# Patient Record
Sex: MALE | Race: WHITE | NOT HISPANIC OR LATINO | Employment: FULL TIME | URBAN - METROPOLITAN AREA
[De-identification: names, ages, dates, MRNs, and addresses within clinical notes are randomized per-mention and may not be internally consistent; named-entity substitution may affect disease eponyms.]

---

## 2017-03-29 ENCOUNTER — TELEPHONE (OUTPATIENT)
Dept: CARDIOLOGY | Facility: CLINIC | Age: 57
End: 2017-03-29

## 2017-03-29 NOTE — TELEPHONE ENCOUNTER
03/29/17  11:26 AM  Jose Negrete  1960    Home Phone [phone]   Mobile [phone]     Mrs. Negrete calls to see when Mr. Negrete is due for a yearly appointment. He was last seen June 2016. Michelle, can you call Mr. Cornell at [phone] or his wife, Laney at [phone] to schedule this.    Also, Laney states that they have applied for new life insurance and the insurance company has sent paperwork to our office to be completed by Dr. Vasquez. Teresa, has this been received? If not, Laney can be reached at [phone].    Patricia PAULSON RN

## 2017-03-30 NOTE — TELEPHONE ENCOUNTER
I SWP and let him know that I have not received his paperwork from life insurance company. He is having them refax...Teresa

## 2017-03-30 NOTE — TELEPHONE ENCOUNTER
tomás REYNOSO has been made.    Teresa sutherland is wondering if you've received any papers from his life insurance company?    Michelle GARCIA

## 2017-06-12 RX ORDER — FLECAINIDE ACETATE 100 MG/1
TABLET ORAL
Qty: 60 TABLET | Refills: 2 | OUTPATIENT
Start: 2017-06-12

## 2017-06-12 RX ORDER — FLECAINIDE ACETATE 100 MG/1
100 TABLET ORAL 2 TIMES DAILY
Qty: 60 TABLET | Refills: 3 | Status: SHIPPED | OUTPATIENT
Start: 2017-06-12 | End: 2017-11-16 | Stop reason: SDUPTHER

## 2017-06-26 ENCOUNTER — OFFICE VISIT (OUTPATIENT)
Dept: CARDIOLOGY | Facility: CLINIC | Age: 57
End: 2017-06-26

## 2017-06-26 VITALS
SYSTOLIC BLOOD PRESSURE: 126 MMHG | HEART RATE: 55 BPM | DIASTOLIC BLOOD PRESSURE: 82 MMHG | BODY MASS INDEX: 23.84 KG/M2 | HEIGHT: 72 IN | WEIGHT: 176 LBS

## 2017-06-26 DIAGNOSIS — R00.2 PALPITATIONS: Primary | ICD-10-CM

## 2017-06-26 DIAGNOSIS — Z86.79 HISTORY OF ATRIAL FIBRILLATION: ICD-10-CM

## 2017-06-26 PROCEDURE — 93000 ELECTROCARDIOGRAM COMPLETE: CPT | Performed by: NURSE PRACTITIONER

## 2017-06-26 PROCEDURE — 99213 OFFICE O/P EST LOW 20 MIN: CPT | Performed by: NURSE PRACTITIONER

## 2017-06-26 NOTE — PROGRESS NOTES
Date of Office Visit: 2017  Encounter Provider: JAMES Morocho  Place of Service: [facility] CARDIOLOGY  Patient Name: Jose Negrete  :1960    Chief Complaint   Patient presents with   • Atrial Fibrillation   • Palpitations   :     HPI: Jose Negrete is a 57 y.o. male who is a patient of Dr. Vasquez's, new to me today. Old records reviewed. He has a past medical history of paroxysmal atrial fib status post pulmonary vein isolation in  in [place].  He has had some intermittent palpitations over the years and some paroxysmal atrial tach for which he has been on flecainide.  When he saw Dr. Vasquez a year ago they discussed stopping the flecainide however he is hesitant to do that and currently takes it about 4 times a week (which is usually when he remembers).  He says he is doing well. He has not had any palpitations.  He denies chest pain, shortness of breath, PND, dizziness or near syncope.  He presents today for routine follow up.       Past Medical History:   Diagnosis Date   • Atrial tachycardia        Past Surgical History:   Procedure Laterality Date   • ATRIAL ABLATION SURGERY         Social History     Social History   • Marital status:      Spouse name: N/A   • Number of children: N/A   • Years of education: N/A     Occupational History   • Not on file.     Social History Main Topics   • Smoking status: Never Smoker   • Smokeless tobacco: Not on file   • Alcohol use Yes   • Drug use: Not on file   • Sexual activity: Not on file     Other Topics Concern   • Not on file     Social History Narrative       Family History   Problem Relation Age of Onset   • Parkinsonism Father        Review of Systems   Constitution: Negative for chills, fever, malaise/fatigue, weight gain and weight loss.   HENT: Negative for ear pain, headaches, hearing loss, nosebleeds and sore throat.    Eyes: Negative for double vision, pain and visual disturbance.  "  Cardiovascular: Negative for chest pain, dyspnea on exertion, irregular heartbeat, leg swelling, near-syncope, orthopnea, palpitations, paroxysmal nocturnal dyspnea and syncope.   Respiratory: Negative for cough, shortness of breath, sleep disturbances due to breathing, snoring and wheezing.    Endocrine: Negative for cold intolerance, heat intolerance and polyuria.   Skin: Negative for itching and rash.   Musculoskeletal: Negative for joint pain, joint swelling and myalgias.   Gastrointestinal: Negative for abdominal pain, diarrhea, melena, nausea and vomiting.   Genitourinary: Negative for frequency, hematuria and hesitancy.   Neurological: Negative for excessive daytime sleepiness, light-headedness, numbness, paresthesias and seizures.   Psychiatric/Behavioral: Negative for altered mental status and depression.   Allergic/Immunologic: Negative.    All other systems reviewed and are negative.      No Known Allergies      Current Outpatient Prescriptions:   •  flecainide (TAMBOCOR) 100 MG tablet, Take 1 tablet by mouth 2 (Two) Times a Day., Disp: 60 tablet, Rfl: 3     Objective:     Vitals:    06/26/17 0900   BP: 126/82   BP Location: Right arm   Patient Position: Sitting   Pulse: 55   Weight: 176 lb (79.8 kg)   Height: 72\" (182.9 cm)     Body mass index is 23.87 kg/(m^2).    PHYSICAL EXAM:    Vitals Reviewed.   General Appearance: No acute distress, well developed and well nourished.   Eyes: Conjunctiva and lids: No erythema, swelling, or discharge. Sclera non-icteric.   HENT: Atraumatic, normocephalic. External eyes, ears, and nose normal. No hearing loss noted. Mucous membranes normal. Lips not cyanotic. Neck supple with no tenderness.  Respiratory: No signs of respiratory distress. Respiration rhythm and depth normal.   Clear to auscultation. No rales, crackles, rhonchi, or wheezing auscultated.   Cardiovascular:  Jugular Venous Pressure: Normal  Heart Rate and Rhythm: Normal, Heart Sounds: Normal S1 and S2. " No S3 or S4 noted.  Murmurs: No murmurs noted. No rubs, thrills, or gallops.   Arterial Pulses:  Posterior tibialis and dorsalis pedis pulses normal.   Lower Extremities: No edema noted.  Gastrointestinal:  Abdomen soft, non-distended, non-tender. Normal bowel sounds. No hepatomegaly.   Musculoskeletal: Normal movement of extremities  Skin and Nails: General appearance normal. No pallor, cyanosis, diaphoresis. Skin temperature normal. No clubbing of fingernails.   Psychiatric: Patient alert and oriented to person, place, and time. Speech and behavior appropriate. Normal mood and affect.       ECG 12 Lead  Date/Time: 6/26/2017 10:03 AM  Performed by: EMILY KEE  Authorized by: EMILY KEE   Comparison: compared with previous ECG from 6/24/2016  Similar to previous ECG  Rhythm: sinus bradycardia  BPM: 55  Clinical impression: normal ECG  Comments: Clinical indication: PAF              Assessment:       Diagnosis Plan   1. Palpitations     2. History of atrial fibrillation            Plan:       1. Palpitations-none since last office visit. Takes flecainide about 4 times per week. He is hesitant to stop it completely.     2. Atrial Fibrillation and Atrial Flutter  Assessment  • The patient has paroxysmal atrial fibrillation  • The patient's CHADS2-VASc score is 0  • A SRC4LO5-PTBf score of 0 is considered a low risk for a thromboembolic event  • PVI in 2004 in [place]. No afib since that time. Hx of PAT controlled on flecainide. Return to see Dr. Vasquez in 1 year. It says he is taking 100mg flecainide twice daily. He thinks the tablets are 50mg. He is going to check and call me back with correct dose so we can entered into the system.     Subjective - Objective  • The patient had atrial fibrillation ablation             As always, it has been a pleasure to participate in your patient's care.      Sincerely,         JAMES Epps

## 2017-11-16 RX ORDER — FLECAINIDE ACETATE 100 MG/1
TABLET ORAL
Qty: 60 TABLET | Refills: 2 | Status: SHIPPED | OUTPATIENT
Start: 2017-11-16 | End: 2018-02-21 | Stop reason: SDUPTHER

## 2018-02-21 RX ORDER — FLECAINIDE ACETATE 100 MG/1
TABLET ORAL
Qty: 60 TABLET | Refills: 2 | Status: SHIPPED | OUTPATIENT
Start: 2018-02-21 | End: 2018-07-19 | Stop reason: SDUPTHER

## 2018-02-27 ENCOUNTER — TELEPHONE (OUTPATIENT)
Dept: CARDIOLOGY | Facility: CLINIC | Age: 58
End: 2018-02-27

## 2018-02-27 NOTE — TELEPHONE ENCOUNTER
Pt's wife called stating pt takes Flecainide 100 mg BID. Recently the  ( BARR ) has changed. Since this change he feels like the medication is no longer working. He has been having more palpitations and A-Fib. Pt appt 6/29/2018 should he be seen sooner...shawn

## 2018-03-28 PROCEDURE — 93000 ELECTROCARDIOGRAM COMPLETE: CPT | Performed by: INTERNAL MEDICINE

## 2018-03-28 NOTE — PROGRESS NOTES
Date of Office Visit: 2018  Encounter Provider: Kirk Vasquez MD  Place of Service: [facility] CARDIOLOGY  Patient Name: Jose Negrete  : 1960    Subjective:     Encounter Date:2018      Patient ID: Jose Negrete is a 58 y.o. male who has a cc of  Palp and a previous ablation in  [facility] in [place].     Palpitations -- couple of times a week and he has racing for hours at a time.   Triggers -- none.       No anginal chest pain,   No sig steele,   No soa,   No fainting,  No orthostasis.   No edema.   Exercise tolerance: walk --     Lots of stress at work    There have been no hospital admission since the last visit.     There have been no bleeding events.       Past Medical History:   Diagnosis Date   • Atrial tachycardia        Social History     Social History   • Marital status:      Spouse name: N/A   • Number of children: N/A   • Years of education: N/A     Occupational History   • Not on file.     Social History Main Topics   • Smoking status: Never Smoker   • Smokeless tobacco: Not on file   • Alcohol use Yes   • Drug use: Unknown   • Sexual activity: Not on file     Other Topics Concern   • Not on file     Social History Narrative   • No narrative on file       Review of Systems   Constitution: Negative for fever and night sweats.   HENT: Negative for ear pain and stridor.    Eyes: Negative for discharge and visual halos.   Cardiovascular: Negative for cyanosis.   Respiratory: Negative for hemoptysis and sputum production.    Hematologic/Lymphatic: Negative for adenopathy.   Skin: Negative for nail changes and unusual hair distribution.   Musculoskeletal: Negative for gout and joint swelling.   Gastrointestinal: Negative for bowel incontinence and flatus.   Genitourinary: Negative for dysuria and flank pain.   Neurological: Negative for seizures and tremors.   Psychiatric/Behavioral: Negative for altered mental status. The patient is not  "nervous/anxious.             Objective:     Vitals:    03/30/18 0844   BP: 128/92   Pulse: 63   Weight: 81.6 kg (180 lb)   Height: 185.4 cm (73\")         Physical Exam   Constitutional: He is oriented to person, place, and time.   HENT:   Head: Normocephalic and atraumatic.   Eyes: Right eye exhibits no discharge. Left eye exhibits no discharge.   Neck: No JVD present. No thyromegaly present.   Cardiovascular: Normal rate and regular rhythm.  Exam reveals no gallop and no friction rub.    No murmur heard.  Pulmonary/Chest: Effort normal and breath sounds normal. He has no rales.   Abdominal: Soft. Bowel sounds are normal. There is no tenderness.   Musculoskeletal: Normal range of motion. He exhibits no edema or deformity.   Neurological: He is alert and oriented to person, place, and time. He exhibits normal muscle tone.   Skin: Skin is warm and dry. No erythema.   Psychiatric: He has a normal mood and affect. His behavior is normal. Thought content normal.         ECG 12 Lead  Date/Time: 3/28/2018 3:30 PM  Performed by: MARIANNE LEIJA  Authorized by: MARIANNE LEIJA   Comparison: compared with previous ECG   Similar to previous ECG  Rhythm: sinus rhythm  Rate: normal  Conduction: LAFB  QRS axis: left  Clinical impression: abnormal ECG            Lab Review:       Assessment:          Diagnosis Plan   1. History of atrial fibrillation     2. Palpitations            Plan:         The onset of these palp occurred when they changed the flecainide      But we don;t these are.     Obv we need a diagnosis. I will order a monitor.     "

## 2018-03-30 ENCOUNTER — OFFICE VISIT (OUTPATIENT)
Dept: CARDIOLOGY | Facility: CLINIC | Age: 58
End: 2018-03-30

## 2018-03-30 VITALS
WEIGHT: 180 LBS | SYSTOLIC BLOOD PRESSURE: 128 MMHG | DIASTOLIC BLOOD PRESSURE: 92 MMHG | HEIGHT: 73 IN | HEART RATE: 63 BPM | BODY MASS INDEX: 23.86 KG/M2

## 2018-03-30 DIAGNOSIS — R00.2 PALPITATIONS: ICD-10-CM

## 2018-03-30 DIAGNOSIS — Z86.79 HISTORY OF ATRIAL FIBRILLATION: Primary | ICD-10-CM

## 2018-03-30 PROCEDURE — 99213 OFFICE O/P EST LOW 20 MIN: CPT | Performed by: INTERNAL MEDICINE

## 2018-03-30 RX ORDER — RANITIDINE 150 MG/1
150 TABLET ORAL 2 TIMES DAILY
COMMUNITY
End: 2020-11-04

## 2018-03-30 RX ORDER — OMEPRAZOLE 20 MG/1
20 CAPSULE, DELAYED RELEASE ORAL DAILY
Refills: 6 | COMMUNITY
Start: 2018-02-21 | End: 2020-01-10

## 2018-04-20 ENCOUNTER — TELEPHONE (OUTPATIENT)
Dept: CARDIOLOGY | Facility: CLINIC | Age: 58
End: 2018-04-20

## 2018-04-20 DIAGNOSIS — I48.0 PAROXYSMAL ATRIAL FIBRILLATION (HCC): Primary | ICD-10-CM

## 2018-04-20 NOTE — TELEPHONE ENCOUNTER
----- Message from Kirk Vasquez MD sent at 4/20/2018 10:57 AM EDT -----  Regarding: Change in Flecainide   I sw him  I increased his flec 150 bid   He has enough  He will get an ecg in about a week and will not start the flec until three days before.

## 2018-04-26 ENCOUNTER — CLINICAL SUPPORT (OUTPATIENT)
Dept: CARDIOLOGY | Facility: CLINIC | Age: 58
End: 2018-04-26

## 2018-04-26 DIAGNOSIS — I48.0 PAROXYSMAL ATRIAL FIBRILLATION (HCC): ICD-10-CM

## 2018-04-26 PROCEDURE — 93000 ELECTROCARDIOGRAM COMPLETE: CPT | Performed by: NURSE PRACTITIONER

## 2018-04-26 NOTE — PROGRESS NOTES
Procedure     ECG 12 Lead  Date/Time: 4/26/2018 9:51 AM  Performed by: EMILY KEE  Authorized by: EMILY KEE   Comparison: compared with previous ECG   Similar to previous ECG  Rhythm: sinus rhythm  BPM: 56  Comments: QRS/QT okay

## 2018-06-04 ENCOUNTER — TELEPHONE (OUTPATIENT)
Dept: CARDIOLOGY | Facility: CLINIC | Age: 58
End: 2018-06-04

## 2018-06-04 NOTE — TELEPHONE ENCOUNTER
Pt's wife called to ask if it is ok for pt to take quercetin a supplement with Flecainide...Teresa

## 2018-07-20 RX ORDER — FLECAINIDE ACETATE 100 MG/1
TABLET ORAL
Qty: 60 TABLET | Refills: 0 | Status: SHIPPED | OUTPATIENT
Start: 2018-07-20 | End: 2020-11-04

## 2020-01-10 ENCOUNTER — OFFICE VISIT (OUTPATIENT)
Dept: CARDIOLOGY | Facility: CLINIC | Age: 60
End: 2020-01-10

## 2020-01-10 VITALS
HEART RATE: 56 BPM | DIASTOLIC BLOOD PRESSURE: 88 MMHG | SYSTOLIC BLOOD PRESSURE: 130 MMHG | BODY MASS INDEX: 23.19 KG/M2 | WEIGHT: 175 LBS | HEIGHT: 73 IN

## 2020-01-10 DIAGNOSIS — R00.2 PALPITATIONS: Primary | ICD-10-CM

## 2020-01-10 DIAGNOSIS — Z86.79 HISTORY OF ATRIAL FIBRILLATION: ICD-10-CM

## 2020-01-10 DIAGNOSIS — R20.0 NUMBNESS OF FINGER: ICD-10-CM

## 2020-01-10 PROCEDURE — 93000 ELECTROCARDIOGRAM COMPLETE: CPT | Performed by: NURSE PRACTITIONER

## 2020-01-10 PROCEDURE — 99214 OFFICE O/P EST MOD 30 MIN: CPT | Performed by: NURSE PRACTITIONER

## 2020-01-10 NOTE — PROGRESS NOTES
"Date of Office Visit: 01/10/2020  Encounter Provider: JAMES Morocho  Place of Service: [facility] CARDIOLOGY  Patient Name: Jose Negrete  :1960    Chief Complaint   Patient presents with   • Atrial Fibrillation   :     HPI: Jose Negrete is a 60 y.o. male who is a patient of Dr. Vasquez's with a history of A. fib, status post ablation in  in [place]. He has had some intermittent PAF and palpitations since ablation. He took flecainide as needed for awhile but when he saws Dr. Vasquez in 3/2018 he was having increased episodes so he started taking the flecainide routinely and presents today for follow up.     Today he reports that that his PAF has been well controlled taking 150mg in the am and 100mg in the pm. He has some occasional \"skips\" but no sustained episodes of AF or heart racing. He denies chest pain, dyspnea, PND, orthopnea, dizziness or edema.     He complains of an episode he had on 19 where his 4th finger on left hand turned white became cold and felt numb. It resolved but the finger still intermittently feels cold and the nail and tip feel numb. 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 He has a dark colored spot on the left 5th finger nail at the base cuticle area that looks like bruising but he says he did not hit it or smash it.      Past Medical History:   Diagnosis Date   • Atrial tachycardia (CMS/HCC)    • History of atrial fibrillation    • Palpitations        Past Surgical History:   Procedure Laterality Date   • ATRIAL ABLATION SURGERY      [facility]       Social History     Socioeconomic History   • Marital status:      Spouse name: Not on file   • Number of children: Not on file   • Years of education: Not on file   • Highest education level: Not on file   Tobacco Use   • Smoking status: Never Smoker   Substance and Sexual Activity   • Alcohol use: Yes       Family History   Problem Relation Age of Onset   • " "Parkinsonism Father        Review of Systems   Constitution: Negative for chills, fever and malaise/fatigue.   Cardiovascular: Negative for chest pain, dyspnea on exertion, leg swelling, near-syncope, orthopnea, palpitations, paroxysmal nocturnal dyspnea and syncope.   Respiratory: Negative for cough and shortness of breath.    Skin: Positive for color change (intermittently of left 4th finger).   Musculoskeletal: Negative for joint pain, joint swelling and myalgias.   Gastrointestinal: Negative for abdominal pain, diarrhea, melena, nausea and vomiting.   Genitourinary: Negative for frequency and hematuria.   Neurological: Positive for numbness (left 4th finger). Negative for light-headedness, paresthesias and seizures.   Allergic/Immunologic: Negative.    All other systems reviewed and are negative.      No Known Allergies      Current Outpatient Medications:   •  flecainide (TAMBOCOR) 100 MG tablet, TAKE 1 TABLET BY MOUTH TWICE DAILY (Patient taking differently: Take  by mouth 2 (Two) Times a Day. Taking 150 mg in am and 100 mg in pm), Disp: 60 tablet, Rfl: 0  •  raNITIdine (ZANTAC) 150 MG tablet, Take 150 mg by mouth 2 (Two) Times a Day., Disp: , Rfl:       Objective:     Vitals:    01/10/20 0933   BP: 130/88   Pulse: 56   Weight: 79.4 kg (175 lb)   Height: 185.4 cm (72.99\")     Body mass index is 23.09 kg/m².    PHYSICAL EXAM:    Vitals Reviewed.   General Appearance: No acute distress, well developed and well nourished.   Eyes: Conjunctiva and lids: No erythema, swelling, or discharge. Sclera non-icteric.   HENT: Atraumatic, normocephalic. External eyes, ears, and nose normal.   Respiratory: No signs of respiratory distress. Respiration rhythm and depth normal.   Clear to auscultation. No rales, crackles, rhonchi, or wheezing auscultated.   Cardiovascular:  Jugular Venous Pressure: Normal  Heart Rate and Rhythm: Normal, Heart Sounds: Normal S1 and S2. No S3 or S4 noted.  Murmurs: No murmurs noted. No rubs, " thrills, or gallops.   Arterial Pulses:  Posterior tibialis and dorsalis pedis pulses normal.   Lower Extremities: No edema noted.  Gastrointestinal:  Abdomen soft, non-distended, non-tender.   Musculoskeletal: Normal movement of extremities  Skin and Nails: General appearance normal. No pallor, cyanosis, diaphoresis. Skin temperature normal. No clubbing of fingernails. Left fourth finger is normal color and temperature today. Left 5th finger with small dark area at the base/cuticle area.   Psychiatric: Patient alert and oriented to person, place, and time. Speech and behavior appropriate. Normal mood and affect.       ECG 12 Lead  Date/Time: 1/10/2020 9:37 AM  Performed by: Aga Cartwright APRN  Authorized by: Aga Cartwright APRN   Comparison: compared with previous ECG   Similar to previous ECG  Rhythm: sinus rhythm  BPM: 56  Comments: QRS duration ok              Assessment:       Diagnosis Plan   1. Palpitations  ECG 12 Lead    Ambulatory Referral to Hand Surgery   2. History of atrial fibrillation  ECG 12 Lead    Ambulatory Referral to Hand Surgery   3. Numbness of finger  Ambulatory Referral to Hand Surgery          Plan:       1.-2. Palpitations, hx of PAF, s/p PVI 2004---had recurrence but no sustained episodes since he started taking flecainide routinely. Has occasional palpitations but no sustained episodes. CV=0 so not on AC and reports no sustained episodes of PAF.     3. Numbness of left fourth finger. Not sure what the etiology of this is---has intermittent discoloration, he is wondering whether it is Raynaud's---referred to hand/Dr. Aguilera for evaluation.    I have ask him to call me and let me know what they say and for now follow up with Dr. Vasquez in 1 year.     As always, it has been a pleasure to participate in your patient's care.      Sincerely,         JAMES Epps

## 2020-11-03 NOTE — PROGRESS NOTES
Electrophysiology Clinic Consult     Jose Negrete  1960  [unfilled]  [unfilled]    11/04/20    DATE OF ADMISSION: (Not on file)  [facility] CARDIOLOGY    Licha Claire, PA  [address] / [facility], [place] [address]    Chief Complaint   Patient presents with   • Palpitations     Problem List:  1. Paroxysmal Atrial Fibrillation/Tachycadia:   a. CHADSVasc = 1   b. Probable successful radiofrequency ablation of atrial tachycardia at 170 beats per minute.PACs and nonsustained atrial arrhythmias arising from the right upper and left inferior pulmonary vein, no isolation performed 2004  c. Treated with Flecainide   d. Echocardiogram 10/27/2014: EF 55-60%, trace MR, RVSP 25  e. Event Monitor 4/2018: 5 days duration. PAF, longest one hour, short episodes of AT  f. Echocardiogram 8/31/2020: EF 55%, mild concentric hypertophy, and normal            History of Present Illness:   Mr. Nugent is a pleasant 60 year old  male, patient of Dr. Vasquez who presents for evaluation of palpitations, and prior Atrial tachycardia ablation per Dr Meraz in 2004. Per the notes there were nonsustained atrial arrhythmias  Arising from the Right upper and left inferior pulmonary veins.  Mr. Negrete did well for many years. In 2014 he started having more intermittent palpitations and started Flecainide PRN. Eventually the palpitations became more frequent and he started taking it daily. He has been on Flecainide 100mg BID since at least 2017. This year, he has had more palpitations and recently saw Dr. Meraz and was taken off of Flecainide in the last couple of months. Dr. Meraz mentioned he may need another ablation.   He describes palpitations as tachy palpitations with associated lightheadedness, lasting 10-30 minutes, relieved by sitting and resting, occurring a couple of times per week. Possibly triggered by heartburn/acid reflux. Since he has been off of Flecainide, and only on  Zebeta he has had less episodes. He is on Xarelto for anticoagulation without issues. He GERD is under control. BP is not an issues with high readings. No thyroid disease. No history of JULIETA. He has not been tested. He does admit to snoring. No tobacco. He drinks 2 drinks per day, usually red wine with dinner and occasional bourbon. He drinks one cup of coffee per day.     No Known Allergies     Cannot display prior to admission medications because the patient has not been admitted in this contact.            Current Outpatient Medications:   •  bisoprolol (ZEBeta) 5 MG tablet, Take 5 mg by mouth Daily., Disp: , Rfl:   •  rivaroxaban (XARELTO) 20 MG tablet, Take 20 mg by mouth Daily., Disp: , Rfl:     Social History     Socioeconomic History   • Marital status:      Spouse name: Not on file   • Number of children: Not on file   • Years of education: Not on file   • Highest education level: Not on file   Tobacco Use   • Smoking status: Never Smoker   Substance and Sexual Activity   • Alcohol use: Yes       Family History   Problem Relation Age of Onset   • Parkinsonism Father    • Heart disease, history of CABG Brother    • Heart disease, VHD  Brother        REVIEW OF SYSTEMS:   CONST:  No weight loss, fever, chills, weakness or fatigue.   HEENT:  No visual loss, blurred vision, double vision, yellow sclerae.                   No hearing loss, congestion, sore throat.   SKIN:      No rashes, urticaria, ulcers, sores.     RESP:     No shortness of breath, hemoptysis, cough, sputum.   GI:           No anorexia, nausea, vomiting, diarrhea. No abdominal pain, melena.   :         No burning on urination, hematuria or increased frequency.  ENDO:    No diaphoresis, cold or heat intolerance. No polyuria or polydipsia.   NEURO:  No headache, dizziness, syncope, paralysis, ataxia, or parasthesias.                  No change in bowel or bladder control. No history of CVA/TIA  MUSC:    No muscle, back pain, joint pain or  "stiffness.   HEME:    No anemia, bleeding, bruising. No history of DVT/PE.  PSYCH:  No history of depression, anxiety    Vitals:    11/04/20 1049   BP: 120/80   BP Location: Left arm   Patient Position: Sitting   Cuff Size: Adult   Pulse: (!) 47   SpO2: 99%   Weight: 82.6 kg (182 lb)   Height: 184.2 cm (72.5\")                 Physical Exam:  GEN: Well nourished, well-developed, no acute distress  HEENT: Normocephalic, atraumatic, PERRLA, moist mucous membranes  NECK: Supple, NO JVD, no thyromegaly, no lymphadenopathy  CARD: S1S2, RRR, no murmur, gallop, rub, PMI NL  LUNGS: Clear to auscultation, normal respiratory effort  ABDOMEN: Soft, nontender, normal bowel sounds  EXTREMITIES: No gross deformities, no clubbing, cyanosis, or edema  SKIN: Warm, dry, no lesions  NEURO: No focal deficits, alert and oriented x 3  PSYCHIATRIC: Normal affect and mood      I personally viewed and interpreted the patient's EKG/Telemetry/lab data    No results found for: GLUCOSE, CALCIUM, NA, K, CO2, CL, BUN, CREATININE, EGFRIFAFRI, EGFRIFNONA, BCR, ANIONGAP  No results found for: WBC, HGB, HCT, MCV, PLT  No results found for: INR, PROTIME  No results found for: TSH, J1DDWFP, U7UXJRV, THYROIDAB          ECG 12 Lead    Date/Time: 11/4/2020 11:15 AM  Performed by: Marv Matias MD  Authorized by: Marv Matias MD   Comparison: compared with previous ECG from 1/10/2020  Similar to previous ECG  Rhythm: sinus bradycardia  BPM: 47                ICD-10-CM ICD-9-CM   1. Paroxysmal atrial fibrillation (CMS/HCC)  I48.0 427.31   2. Atrial tachycardia (CMS/HCC)  I47.1 427.89       Assessment and Plan:   1. Paroxysmal Atrial Fibrillation:  - first diagnosed at time of AT RFA in 2004, and has been treated with Flecainide since then. Now, off Flecainide due to breakthrough episodes of atrial fibrillation documented on event monitor. Options for treatment discussed with the patient today including PVA and medication. Currently, the patient " would like to avoid procedure due to health issues with his wife. He may be interested in ablation in the future. He is currently on Zebeta and has side effects of fatigue. Would recommend Rythmol at this time. Will prescribe Rythmol  mg BID with EKG 2 days after starting the medication. Will stop Zebeta today.   - CHADSvasc = 1 on Xarelto, will continue for now in setting of starting new AAD. Later, could use Xarelto PRN.     2. Atrial Tachycardia:  - s/p RFA in 2004        Scribed for Marv Matias MD by Gisel Damico PA-C. 11/4/2020  11:16 EST     I, Marv Matias MD, personally performed the services described in this documentation as scribed by the above named individual in my presence, and it is both accurate and complete.  11/4/2020  11:24 EST

## 2020-11-04 ENCOUNTER — CONSULT (OUTPATIENT)
Dept: CARDIOLOGY | Facility: CLINIC | Age: 60
End: 2020-11-04

## 2020-11-04 VITALS
DIASTOLIC BLOOD PRESSURE: 80 MMHG | BODY MASS INDEX: 24.12 KG/M2 | HEART RATE: 47 BPM | SYSTOLIC BLOOD PRESSURE: 120 MMHG | WEIGHT: 182 LBS | HEIGHT: 73 IN | OXYGEN SATURATION: 99 %

## 2020-11-04 DIAGNOSIS — I47.1 ATRIAL TACHYCARDIA (HCC): ICD-10-CM

## 2020-11-04 DIAGNOSIS — I48.0 PAROXYSMAL ATRIAL FIBRILLATION (HCC): Primary | ICD-10-CM

## 2020-11-04 DIAGNOSIS — I48.0 PAF (PAROXYSMAL ATRIAL FIBRILLATION) (HCC): Primary | ICD-10-CM

## 2020-11-04 PROBLEM — I47.19 ATRIAL TACHYCARDIA: Status: ACTIVE | Noted: 2020-11-04

## 2020-11-04 PROCEDURE — 99204 OFFICE O/P NEW MOD 45 MIN: CPT | Performed by: INTERNAL MEDICINE

## 2020-11-04 PROCEDURE — 93000 ELECTROCARDIOGRAM COMPLETE: CPT | Performed by: INTERNAL MEDICINE

## 2020-11-04 RX ORDER — PROPAFENONE HYDROCHLORIDE 300 MG/1
300 TABLET, COATED ORAL 2 TIMES DAILY
Qty: 60 TABLET | Refills: 6 | Status: SHIPPED | OUTPATIENT
Start: 2020-11-04 | End: 2020-11-10 | Stop reason: SDUPTHER

## 2020-11-04 RX ORDER — BISOPROLOL FUMARATE 5 MG/1
5 TABLET, FILM COATED ORAL DAILY
COMMUNITY
End: 2020-11-10

## 2020-11-10 ENCOUNTER — CLINICAL SUPPORT (OUTPATIENT)
Dept: CARDIOLOGY | Facility: CLINIC | Age: 60
End: 2020-11-10

## 2020-11-10 DIAGNOSIS — I48.0 PAROXYSMAL ATRIAL FIBRILLATION (HCC): Primary | ICD-10-CM

## 2020-11-10 PROCEDURE — 93000 ELECTROCARDIOGRAM COMPLETE: CPT | Performed by: INTERNAL MEDICINE

## 2020-11-10 RX ORDER — PROPAFENONE HYDROCHLORIDE 300 MG/1
300 TABLET, COATED ORAL 2 TIMES DAILY
Qty: 60 TABLET | Refills: 6 | Status: SHIPPED | OUTPATIENT
Start: 2020-11-10

## 2020-11-10 NOTE — TELEPHONE ENCOUNTER
I received a call from the  ([place]) that the patients wife left a message that we never sent in his new prescription. The propafenone was sent to [facility] and the patient apparently wanted it sent to [facility]. I called the patient to relay this information. He said he called Dr. Meraz and he sent in 150mg BID for him so he has just been taking 2 tabs BID. He started this on Saturday. He did not make it for the EKG yesterday but plans to come today. I offered to fax the order to a local MD and he prefers to come to our office. I told him I will send the prescription in to the preferred pharmacy ([facility]). He plans to take the rest of the 150mg tabs before he fills the next prescription in case it doesn't work for him. He will also call us back and update us if it is working well or not. So far he has had an increase in a-fib since stopping the zebeta and initiating propafenone.

## 2020-11-11 ENCOUNTER — TELEPHONE (OUTPATIENT)
Dept: CARDIOLOGY | Facility: CLINIC | Age: 60
End: 2020-11-11

## 2021-03-22 ENCOUNTER — BULK ORDERING (OUTPATIENT)
Dept: CASE MANAGEMENT | Facility: OTHER | Age: 61
End: 2021-03-22

## 2021-03-22 DIAGNOSIS — Z23 IMMUNIZATION DUE: ICD-10-CM

## 2022-09-06 ENCOUNTER — OFFICE (OUTPATIENT)
Dept: URBAN - METROPOLITAN AREA CLINIC 66 | Facility: CLINIC | Age: 62
End: 2022-09-06

## 2022-09-06 VITALS
HEIGHT: 72 IN | WEIGHT: 185 LBS | SYSTOLIC BLOOD PRESSURE: 108 MMHG | DIASTOLIC BLOOD PRESSURE: 70 MMHG | HEART RATE: 64 BPM

## 2022-09-06 DIAGNOSIS — R13.10 DYSPHAGIA, UNSPECIFIED: ICD-10-CM

## 2022-09-06 PROCEDURE — 99204 OFFICE O/P NEW MOD 45 MIN: CPT | Performed by: INTERNAL MEDICINE

## 2023-04-27 ENCOUNTER — AMBULATORY SURGICAL CENTER (OUTPATIENT)
Dept: URBAN - METROPOLITAN AREA SURGERY 20 | Facility: SURGERY | Age: 63
End: 2023-04-27

## 2023-04-27 ENCOUNTER — OFFICE (OUTPATIENT)
Dept: URBAN - METROPOLITAN AREA PATHOLOGY 4 | Facility: PATHOLOGY | Age: 63
End: 2023-04-27

## 2023-04-27 VITALS — HEIGHT: 72 IN

## 2023-04-27 DIAGNOSIS — D12.2 BENIGN NEOPLASM OF ASCENDING COLON: ICD-10-CM

## 2023-04-27 DIAGNOSIS — Z86.010 PERSONAL HISTORY OF COLONIC POLYPS: ICD-10-CM

## 2023-04-27 DIAGNOSIS — K64.1 SECOND DEGREE HEMORRHOIDS: ICD-10-CM

## 2023-04-27 DIAGNOSIS — K57.30 DIVERTICULOSIS OF LARGE INTESTINE WITHOUT PERFORATION OR ABS: ICD-10-CM

## 2023-04-27 PROBLEM — K63.5 POLYP OF COLON: Status: ACTIVE | Noted: 2023-04-27

## 2023-04-27 LAB
GI HISTOLOGY: A. SELECT: (no result)
GI HISTOLOGY: PDF REPORT: (no result)

## 2023-04-27 PROCEDURE — 88305 TISSUE EXAM BY PATHOLOGIST: CPT | Performed by: INTERNAL MEDICINE

## 2023-04-27 PROCEDURE — 45385 COLONOSCOPY W/LESION REMOVAL: CPT | Mod: 33 | Performed by: INTERNAL MEDICINE

## 2023-04-27 NOTE — SERVICEHPINOTES
patient states his reflux and dysphagia completely resolved with omeprazole and thus not wanting upper scope

## 2023-09-05 ENCOUNTER — TELEPHONE (OUTPATIENT)
Dept: CARDIOLOGY | Facility: CLINIC | Age: 63
End: 2023-09-05
Payer: COMMERCIAL

## 2023-09-05 NOTE — TELEPHONE ENCOUNTER
The [facility] received a fax that requires your attention. The document has been indexed to the patient’s chart for your review.      Reason for sending: EXTERNAL MEDICAL RECORD NOTIFICATION     Documents Description: EXTMEDREC-RESCHEDULE REQUEST [facility]-9.5.23    Name of Sender: [facility]     Date Indexed: 9.5.23    ALSO INDEXED   PROG NOTE 8.30.23  EKG 8.30.23

## 2023-10-12 NOTE — PROGRESS NOTES
Electrophysiology Clinic Consult     Jose Negrete  1960  [unfilled]  [unfilled]    10/18/23    DATE OF ADMISSION: (Not on file)  [facility] CARDIOLOGY    Licha Claire, [place]  [address] / [facility] [address]  Referring Provider: Mp Meraz MD     Chief Complaint   Patient presents with    Atrial Fibrillation       Problem list:  Atrial fibrillation / atrial tachycardia   CHADSVasc = 1, Xarelto   Probable successful radiofrequency ablation of atrial tachycardia at 170 beats per minute.PACs and nonsustained atrial arrhythmias arising from the right upper and left inferior pulmonary vein, no isolation performed 2004  Treated with Flecainide 2017  Echocardiogram 10/27/2014: EF 55-60%, trace MR, RVSP 25  Event Monitor 4/2018: 5 days duration. PAF, longest one hour, short episodes of AT  Echocardiogram 8/31/2020: EF 55%, mild concentric hypertophy, mild MR, mild TR  Breakthrough afib on Flecainide, discontinued. Initiated Rythmol 11/2020  Breakthrough afib on Rythmol   Past Medical History:   Diagnosis Date    Acid reflux     Arrhythmia     Atrial tachycardia     History of atrial fibrillation     Palpitations          History of Present Illness:   Jose Negrete is a 63 year old male with above PMHx previous patient of Dr. Vasquez, referred by Dr. Meraz for management of afib and possible need for redo ablation. He has had previous ablation in 2004 for atrial arrhythmias arising from the pulmonary veins. We did see him in 2020 to discuss redo ablation but at that time, he preferred to use antiarrhythmics instead. We started him on Rythmol at that time, however, he is back on Flecainide 50 mg BID. He states the Rythmol was not effective. He has been back on flecainide for two months and doing very well. Now, still does not want to do an ablation right now but is interested in the near future. When in atrial fibrillation, he feels fluttering in his heart, dizziness. He  is tolerating Xarelto well without bleeding issues.   No JULIETA  No tobacco  ETOH: socially   Stimulants: none      No Known Allergies     Cannot display prior to admission medications because the patient has not been admitted in this contact.              Current Outpatient Medications:     flecainide (TAMBOCOR) 50 MG tablet, Take 1 tablet by mouth Every 12 (Twelve) Hours., Disp: , Rfl:     metoprolol succinate XL (TOPROL-XL) 50 MG 24 hr tablet, Take 1 tablet by mouth Every 12 (Twelve) Hours., Disp: , Rfl:     omeprazole (priLOSEC) 40 MG capsule, Take 1 capsule by mouth Daily., Disp: , Rfl:     rivaroxaban (XARELTO) 20 MG tablet, Take 1 tablet by mouth Daily., Disp: , Rfl:     Social History     Socioeconomic History    Marital status:    Tobacco Use    Smoking status: Never     Passive exposure: Never    Smokeless tobacco: Never   Vaping Use    Vaping Use: Never used   Substance and Sexual Activity    Alcohol use: Yes       Family History   Problem Relation Age of Onset    Parkinsonism Father     Heart disease Brother     Heart disease Brother       Parkinsonism Father      Heart disease, history of CABG Brother      Heart disease, VHD  Brother        REVIEW OF SYSTEMS:   CONST:  No weight loss, fever, chills, weakness or fatigue.   HEENT:  No visual loss, blurred vision, double vision, yellow sclerae.                   No hearing loss, congestion, sore throat.   SKIN:      No rashes, urticaria, ulcers, sores.     RESP:     No shortness of breath, hemoptysis, cough, sputum.   GI:           No anorexia, nausea, vomiting, diarrhea. No abdominal pain, melena.   :         No burning on urination, hematuria or increased frequency.  ENDO:    No diaphoresis, cold or heat intolerance. No polyuria or polydipsia.   NEURO:  No headache, dizziness, syncope, paralysis, ataxia, or parasthesias.                  No change in bowel or bladder control. No history of CVA/TIA  MUSC:    No muscle, back pain, joint pain or  "stiffness.   HEME:    No anemia, bleeding, bruising. No history of DVT/PE.  PSYCH:  No history of depression, anxiety    Vitals:    10/18/23 1138   BP: 124/78   BP Location: Left arm   Patient Position: Sitting   Pulse: (!) 49   SpO2: 96%   Weight: 84.1 kg (185 lb 6.4 oz)   Height: 182.9 cm (72\")                 Physical Exam:  GEN: Well nourished, well-developed, no acute distress  HEENT: Normocephalic, atraumatic, PERRLA, moist mucous membranes  NECK: Supple, NO JVD, no thyromegaly, no lymphadenopathy  CARD: Regular rate rhythm normal S1-S2.  There is an S4.  No murmurs rubs or gallops appreciated.  LUNGS: Clear to auscultation, normal respiratory effort  ABDOMEN: Soft, nontender, normal bowel sounds  EXTREMITIES: No gross deformities, no clubbing, cyanosis, or edema  SKIN: Warm, dry  NEURO: No focal deficits, alert and oriented x 3  PSYCHIATRIC: Normal affect and mood      I personally viewed and interpreted the patient's EKG/Telemetry/lab data    No results found for: \"GLUCOSE\", \"CALCIUM\", \"NA\", \"K\", \"CO2\", \"CL\", \"BUN\", \"CREATININE\", \"EGFRIFAFRI\", \"EGFRIFNONA\", \"BCR\", \"ANIONGAP\"  Lab Results   Component Value Date    WBC 5.73 09/23/2021    HGB 16.8 09/23/2021    HCT 46.9 09/23/2021    MCV 90.2 09/23/2021     09/23/2021     No results found for: \"INR\", \"PROTIME\"  No results found for: \"TSH\", \"B9GBLXY\", \"T9MYGAU\", \"THYROIDAB\"             ECG 12 Lead    Date/Time: 10/18/2023 12:02 PM  Performed by: Marv Matias MD    Authorized by: Marv Matias MD  Comparison: compared with previous ECG from 8/30/2023  Similar to previous ECG  Rhythm: sinus rhythm  BPM: 49            ICD-10-CM ICD-9-CM   1. Paroxysmal atrial fibrillation  I48.0 427.31   2. Atrial tachycardia  I47.19 427.89       Assessment and Plan:   Paroxysmal Atrial Fibrillation:  - first diagnosed at time of AT RFA in 2004, and has been treated with Flecainide since then. Failed Rythmol. Currently doing ok on Flecainide. Options for treatment " discussed with the patient today including PVA and possibly with PFA.  Currently, the patient is doing well, and discussed PVA in the future if he starts having more AFIB. He agrees with this treatment plan.  He would like to wait for PFA in the future.  - CHADSvasc = 1 on Xarelto     2. Atrial Tachycardia:  - s/p RFA in 2004    Follow up 10/2024     Scribed for Marv Matias MD by Gisel Damico PA-C. 10/18/2023  12:03 EDT    I, Marv Matias MD, personally performed the services described in this documentation as scribed by the above named individual in my presence, and it is both accurate and complete.  10/18/2023  13:56 EDT

## 2023-10-18 ENCOUNTER — OFFICE VISIT (OUTPATIENT)
Dept: CARDIOLOGY | Facility: CLINIC | Age: 63
End: 2023-10-18
Payer: COMMERCIAL

## 2023-10-18 VITALS
HEIGHT: 72 IN | SYSTOLIC BLOOD PRESSURE: 124 MMHG | DIASTOLIC BLOOD PRESSURE: 78 MMHG | WEIGHT: 185.4 LBS | HEART RATE: 49 BPM | BODY MASS INDEX: 25.11 KG/M2 | OXYGEN SATURATION: 96 %

## 2023-10-18 DIAGNOSIS — I48.0 PAROXYSMAL ATRIAL FIBRILLATION: Primary | ICD-10-CM

## 2023-10-18 DIAGNOSIS — I47.19 ATRIAL TACHYCARDIA: ICD-10-CM

## 2023-10-18 RX ORDER — METOPROLOL SUCCINATE 50 MG/1
50 TABLET, EXTENDED RELEASE ORAL EVERY 12 HOURS SCHEDULED
COMMUNITY
Start: 2023-09-22

## 2023-10-18 RX ORDER — OMEPRAZOLE 40 MG/1
40 CAPSULE, DELAYED RELEASE ORAL DAILY
COMMUNITY

## 2023-10-18 RX ORDER — FLECAINIDE ACETATE 50 MG/1
50 TABLET ORAL EVERY 12 HOURS SCHEDULED
COMMUNITY
Start: 2023-10-06

## 2023-10-18 RX ORDER — METOPROLOL TARTRATE 50 MG/1
50 TABLET, FILM COATED ORAL 2 TIMES DAILY
COMMUNITY
End: 2023-10-18

## 2024-02-27 ENCOUNTER — TELEPHONE (OUTPATIENT)
Dept: CARDIOLOGY | Facility: CLINIC | Age: 64
End: 2024-02-27
Payer: COMMERCIAL

## 2024-02-27 DIAGNOSIS — I48.0 PAROXYSMAL ATRIAL FIBRILLATION: ICD-10-CM

## 2024-02-27 DIAGNOSIS — R00.2 PALPITATIONS: Primary | ICD-10-CM

## 2024-02-27 NOTE — TELEPHONE ENCOUNTER
Patient would like to know if a PFA is an option for him now. If so, he would like to schedule it.

## 2024-08-09 ENCOUNTER — PREP FOR SURGERY (OUTPATIENT)
Dept: OTHER | Facility: HOSPITAL | Age: 64
End: 2024-08-09
Payer: COMMERCIAL

## 2024-08-09 DIAGNOSIS — I48.0 PAROXYSMAL ATRIAL FIBRILLATION: Primary | ICD-10-CM

## 2024-08-09 RX ORDER — ACETAMINOPHEN 325 MG/1
650 TABLET ORAL EVERY 4 HOURS PRN
OUTPATIENT
Start: 2024-08-09

## 2024-08-09 RX ORDER — SODIUM CHLORIDE 0.9 % (FLUSH) 0.9 %
3 SYRINGE (ML) INJECTION EVERY 12 HOURS SCHEDULED
OUTPATIENT
Start: 2024-08-09

## 2024-08-09 RX ORDER — NITROGLYCERIN 0.4 MG/1
0.4 TABLET SUBLINGUAL
OUTPATIENT
Start: 2024-08-09

## 2024-08-09 RX ORDER — SODIUM CHLORIDE 0.9 % (FLUSH) 0.9 %
10 SYRINGE (ML) INJECTION AS NEEDED
OUTPATIENT
Start: 2024-08-09

## 2024-08-09 RX ORDER — SODIUM CHLORIDE 9 MG/ML
1 INJECTION, SOLUTION INTRAVENOUS CONTINUOUS
OUTPATIENT
Start: 2024-08-09 | End: 2024-08-09

## 2024-08-09 RX ORDER — SODIUM CHLORIDE 9 MG/ML
40 INJECTION, SOLUTION INTRAVENOUS AS NEEDED
OUTPATIENT
Start: 2024-08-09

## 2024-10-02 NOTE — NURSING NOTE
PRE-PVA ASSESSMENT  Jose Negrete 1960   [address] [facility] [address]   [phone]        Referral Source: ROSA Martinez   Information obtained from: [x] Medical record review  [x] Patient report  Scheduled for: PVA w/PFA on 10/29/24 with Dr. Matias  No Known Allergies    AFib Specific History:  AFIBTYPE: paroxysmal    CHADS-VASc Risk Assessment               1 Total Score    1 Age 65-74    Criteria that do not apply:    CHF    Hypertension    Age >/= 75    DM    PRIOR STROKE/TIA/THROMBO    Vascular Disease    Sex: Female            Anticoagulation: Xarelto 20 mg daily, NO missed doses.   Cardioversion x 0  Failed AAD(s): flecainide, Rythmol    Prior Ablation: AT RFA in 2004, Dr. Vasquez     Is Mr. Negrete aware of his AFib? Yes   Onset: 2004    Exacerbations: none   Frequency: a couple times weekly   Alleviations: medications      Duration: hours     Symptoms:   [x] Palpitations:    [] Chest Discomfort:    [x] Dizziness:    [x] Presyncope:    [] Lightheadedness:   [] Syncope:    [] Fatigue:    [] Other:     [] Short of Breath:     Last Echo(s):    [x] TTE Date: 08/31/2020    LVEF is 55%  Mild LVH  MR mild   TR Mild/RVSP 37mmHg  Patient in AF with RVR during study               Past medical History:     [] Diabetes NO               Requested labs from Dr. Meraz     [] HYPOthyroidism NO  [] HYPERthyroidism NO    Requested labs from Dr. Meraz     [x] HTN        [x] Tx: Metoprolol XL 50 mg            [] Heart Failure  NO   [] CVA    NO                           [] TIA NO        [] CAD   NO      [] MI  NO           [] Dyslipidemia NO  [] Statin indicated NO     [x] Ischemic Evaluation NO       [x] Stress Test: 8540-8079 Dr. Turner, patient reports negative results.        [] Heart Cath: NO     [x] Sleep Apnea Suspected        [x] Discussed with Mr. Negrete - denies symptoms         [] Obesity NO (BMI 25)         Summary of Patient Contact:      I spoke with Mr. Negrete about his upcoming  PVA.   He was well informed about the procedure from prior discussion with Dr. Matias and from reading the provided literature.  We discussed the procedure at length including risks, anesthesia, intra-op procedures, recovery, bedrest, normal post-procedure expectations, and success rates.  I answered a few remaining questions. Mr. Negrete verbalized understanding and he is ready to proceed.

## 2024-10-03 ENCOUNTER — TELEPHONE (OUTPATIENT)
Dept: CARDIOLOGY | Facility: CLINIC | Age: 64
End: 2024-10-03

## 2024-10-03 NOTE — TELEPHONE ENCOUNTER
Caller: Jose Negrete    Relationship: Self    Best call back number: [phone]     What is the best time to reach you: ANY    Who are you requesting to speak with (clinical staff, provider,  specific staff member): CLINICAL    What was the call regarding: PATIENT CALLED TO ADVISE HE MISSED A CALL FROM THE OFFICE AROUND 3:00 PM ON 10-3-24. WAS UNABLE TO FIND ANY NOTE IN THE CHART. PLEASE CONTACT PATIENT AT YOUR EARLIEST CONVENIENCE.    Is it okay if the provider responds through [employer]: PLEASE CALL

## 2024-10-15 ENCOUNTER — TELEPHONE (OUTPATIENT)
Dept: CARDIOLOGY | Facility: CLINIC | Age: 64
End: 2024-10-15
Payer: COMMERCIAL

## 2024-10-15 NOTE — TELEPHONE ENCOUNTER
Patient called an left a message. I tried to call him back but he did not answer. I did not get the option to leave a message.

## 2024-10-22 ENCOUNTER — HOSPITAL ENCOUNTER (OUTPATIENT)
Dept: CT IMAGING | Facility: HOSPITAL | Age: 64
Discharge: HOME | End: 2024-10-22
Payer: COMMERCIAL

## 2024-10-22 ENCOUNTER — PRE-ADMISSION TESTING (OUTPATIENT)
Dept: PREADMISSION TESTING | Facility: HOSPITAL | Age: 64
End: 2024-10-22
Payer: COMMERCIAL

## 2024-10-22 DIAGNOSIS — I48.0 PAROXYSMAL ATRIAL FIBRILLATION: ICD-10-CM

## 2024-10-22 DIAGNOSIS — R00.2 PALPITATIONS: ICD-10-CM

## 2024-10-22 LAB
ANION GAP SERPL CALCULATED.3IONS-SCNC: 9 MMOL/L (ref 5–15)
BUN SERPL-MCNC: 21 MG/DL (ref 8–23)
BUN/CREAT SERPL: 18.8 (ref 7–25)
CALCIUM SPEC-SCNC: 9.3 MG/DL (ref 8.6–10.5)
CHLORIDE SERPL-SCNC: 105 MMOL/L (ref 98–107)
CO2 SERPL-SCNC: 25 MMOL/L (ref 22–29)
CREAT SERPL-MCNC: 1.12 MG/DL (ref 0.76–1.27)
DEPRECATED RDW RBC AUTO: 41.8 FL (ref 37–54)
EGFRCR SERPLBLD CKD-EPI 2021: 72.5 ML/MIN/1.73
ERYTHROCYTE [DISTWIDTH] IN BLOOD BY AUTOMATED COUNT: 12.6 % (ref 12.3–15.4)
GLUCOSE SERPL-MCNC: 112 MG/DL (ref 65–99)
HBA1C MFR BLD: 5.4 % (ref 4.8–5.6)
HCT VFR BLD AUTO: 42.3 % (ref 37.5–51)
HGB BLD-MCNC: 14.2 G/DL (ref 13–17.7)
INR PPP: 1.75 (ref 0.89–1.12)
MCH RBC QN AUTO: 30.8 PG (ref 26.6–33)
MCHC RBC AUTO-ENTMCNC: 33.6 G/DL (ref 31.5–35.7)
MCV RBC AUTO: 91.8 FL (ref 79–97)
PLATELET # BLD AUTO: 210 10*3/MM3 (ref 140–450)
PMV BLD AUTO: 10.2 FL (ref 6–12)
POTASSIUM SERPL-SCNC: 4.7 MMOL/L (ref 3.5–5.2)
PROTHROMBIN TIME: 20.5 SECONDS (ref 12.2–14.5)
RBC # BLD AUTO: 4.61 10*6/MM3 (ref 4.14–5.8)
SODIUM SERPL-SCNC: 139 MMOL/L (ref 136–145)
WBC NRBC COR # BLD AUTO: 5.06 10*3/MM3 (ref 3.4–10.8)

## 2024-10-22 PROCEDURE — 36415 COLL VENOUS BLD VENIPUNCTURE: CPT

## 2024-10-22 PROCEDURE — 80048 BASIC METABOLIC PNL TOTAL CA: CPT

## 2024-10-22 PROCEDURE — 85610 PROTHROMBIN TIME: CPT

## 2024-10-22 PROCEDURE — 71275 CT ANGIOGRAPHY CHEST: CPT

## 2024-10-22 PROCEDURE — 85027 COMPLETE CBC AUTOMATED: CPT

## 2024-10-22 PROCEDURE — 83036 HEMOGLOBIN GLYCOSYLATED A1C: CPT | Performed by: PHYSICIAN ASSISTANT

## 2024-10-22 PROCEDURE — 25510000001 IOPAMIDOL PER 1 ML: Performed by: INTERNAL MEDICINE

## 2024-10-22 RX ORDER — IOPAMIDOL 755 MG/ML
80 INJECTION, SOLUTION INTRAVASCULAR
Status: COMPLETED | OUTPATIENT
Start: 2024-10-22 | End: 2024-10-22

## 2024-10-22 RX ADMIN — IOPAMIDOL 80 ML: 755 INJECTION, SOLUTION INTRAVENOUS at 12:30

## 2024-10-22 NOTE — DISCHARGE INSTRUCTIONS
Dear Patient,    Do NOT eat, drink, or smoke after midnight the night before your procedure.   Take your medications as instructed by your doctor.    Glasses and jewelry may be worn, but dentures must be removed prior to your procedure.    Leave any items you consider valuable at home.      MORNING of your Procedure, please bring the following:     -Photo ID and insurance card(s)    -ALL medications in their ORIGINAL CONTAINERS or current medication list.    -Co-pay and/or deductible required by your insurance   -Copy of living will or power of  document (if not brought to Pre-Admission Testing department)   -CPAP mask and tubing, not your machine (if applicable)   -Relaxation aids (music, books, magazines)   -Skin Prep Instruction Sheet (if applicable)       Check in is on the 2nd floor of the [facility].  Your procedure will be performed in the cath lab or EP lab.  During your procedure, your family will wait in the cath lab waiting area where you checked in.      Need to make arrangements for transportation prior to discharge.    Educational handout related to procedure was given in Pre-Admission testing.  The educational handout is for informational purposes only.  If you have any questions about your procedure, please speak with your physician.      Please note:  If you are scheduled to have one of the following procedures: Pulmonary Vein Ablation, Lead Extraction, MitraClip, Cerebral Coilings or Embolization, please let your family know that after your procedure you will be going to recovery unit on the 2nd floor of the [facility].  When the physician is finished speaking with your family after your procedure is completed, your family will be directed or escorted to the surgery waiting area in the [facility].  This is where your family will wait until you are given a room assignment and then your family will be directed to the appropriate unit.

## 2024-10-23 ENCOUNTER — OFFICE VISIT (OUTPATIENT)
Dept: CARDIOLOGY | Facility: CLINIC | Age: 64
End: 2024-10-23
Payer: COMMERCIAL

## 2024-10-23 VITALS
DIASTOLIC BLOOD PRESSURE: 80 MMHG | SYSTOLIC BLOOD PRESSURE: 126 MMHG | HEIGHT: 72 IN | HEART RATE: 47 BPM | OXYGEN SATURATION: 96 % | WEIGHT: 181.8 LBS | BODY MASS INDEX: 24.62 KG/M2

## 2024-10-23 DIAGNOSIS — I47.19 ATRIAL TACHYCARDIA: ICD-10-CM

## 2024-10-23 DIAGNOSIS — I48.0 PAROXYSMAL ATRIAL FIBRILLATION: Primary | ICD-10-CM

## 2024-10-23 PROCEDURE — 93000 ELECTROCARDIOGRAM COMPLETE: CPT | Performed by: INTERNAL MEDICINE

## 2024-10-23 PROCEDURE — 99214 OFFICE O/P EST MOD 30 MIN: CPT | Performed by: INTERNAL MEDICINE

## 2024-10-23 NOTE — PROGRESS NOTES
Jose Negrete  1960  [phone]    10/23/2024    [facility] CARDIOLOGY     Referring Provider: No ref. provider found     Licha Claire, PA  [address]  [facility] [address]    Chief Complaint   Patient presents with    Paroxysmal atrial fibrillation       Problem List:     Atrial fibrillation / atrial tachycardia   CHADSVasc = 1, Xarelto   Probable successful radiofrequency ablation of atrial tachycardia at 170 beats per minute.PACs and nonsustained atrial arrhythmias arising from the right upper and left inferior pulmonary vein, no isolation performed 2004  Treated with Flecainide 2017  Echocardiogram 10/27/2014: EF 55-60%, trace MR, RVSP 25  Event Monitor 4/2018: 5 days duration. PAF, longest one hour, short episodes of AT  Echocardiogram 8/31/2020: EF 55%, mild concentric hypertophy, mild MR, mild TR  Breakthrough afib on Flecainide, discontinued. Initiated Rythmol 11/2020  Breakthrough afib on Rythmol     Allergies  Allergies   Allergen Reactions    Epinephrine Provider Review Needed       Current Medications    Current Outpatient Medications:     flecainide (TAMBOCOR) 50 MG tablet, Take 1 tablet by mouth Every 12 (Twelve) Hours., Disp: , Rfl:     metoprolol succinate XL (TOPROL-XL) 50 MG 24 hr tablet, Take 1 tablet by mouth Every 12 (Twelve) Hours., Disp: , Rfl:     NON FORMULARY, Take 1 dose by mouth Daily. INFLA-650 INFLAMMATION MEDICATION, Disp: , Rfl:     omeprazole (priLOSEC) 40 MG capsule, Take 1 capsule by mouth Daily., Disp: , Rfl:     rivaroxaban (XARELTO) 20 MG tablet, Take 1 tablet by mouth Daily., Disp: , Rfl:     History of Present Illness     Pt presents for follow up of atrial fibrillation. Since we last saw the pt, pt denies any AF episodes, SOB, CP, LH, and dizziness. Denies any hospitalizations, ER visits.  Blood pressure has been stable at home.      Vitals:    10/23/24 1519   BP: 126/80   BP Location: Left arm   Patient Position: Sitting   Pulse:  "(!) 47   SpO2: 96%   Weight: 82.5 kg (181 lb 12.8 oz)   Height: 182.9 cm (72\")     Body mass index is 24.66 kg/m².  PE:  General: NAD  Neck: no JVD, no carotid bruits, no TM  Heart RRR, NL S1, S2, S4 present, no rubs, murmurs  Lungs: CTA, no wheezes, rhonchi, or rales  Abd: soft, non-tender, NL BS  Ext: No musculoskeletal deformities, no edema, cyanosis, or clubbing  Psych: normal mood and affect    Diagnostic Data:        ECG 12 Lead    Date/Time: 10/23/2024 4:20 PM  Performed by: Marv Matias MD    Authorized by: Marv Matias MD  Comparison: compared with previous ECG from 8/30/2023  Similar to previous ECG  Rhythm: sinus bradycardia  BPM: 46               1. Paroxysmal atrial fibrillation    2. Atrial tachycardia          Plan:    Paroxysmal Atrial Fibrillation:  - first diagnosed at time of AT RFA in 2004, and has been treated with Flecainide since then. Failed Rythmol. Currently doing ok on Flecainide and beta-blockers.  Long discussion with the patient and his wife today.  We discussed all the pros and cons of pursuing catheter ablation as the patient has been scheduled in the upcoming week.  I discussed in detail the risk of the procedure including bleeding, stroke, and death.  Also discussed the option of not doing the procedure and staying on current medical regimen.  Also discussed the possibility of coming off of Xarelto if the pulmonary vein ablation was successful.  After long discussion, the family and patient has decided to pursue catheter ablation of his atrial fibrillation which has been scheduled in the upcoming 1 to 2 weeks.  - CHADSvasc = 1 on Xarelto     2. Atrial Tachycardia:  - s/p RFA in 2004.  No clinical recurrence.    F/up in 6 months      "

## 2024-10-27 ENCOUNTER — ANESTHESIA EVENT (OUTPATIENT)
Dept: CARDIOLOGY | Facility: HOSPITAL | Age: 64
End: 2024-10-27
Payer: COMMERCIAL

## 2024-10-29 ENCOUNTER — HOSPITAL ENCOUNTER (OUTPATIENT)
Facility: HOSPITAL | Age: 64
Discharge: HOME | End: 2024-10-29
Attending: INTERNAL MEDICINE | Admitting: INTERNAL MEDICINE
Payer: COMMERCIAL

## 2024-10-29 ENCOUNTER — ANESTHESIA (OUTPATIENT)
Dept: CARDIOLOGY | Facility: HOSPITAL | Age: 64
End: 2024-10-29
Payer: COMMERCIAL

## 2024-10-29 VITALS
DIASTOLIC BLOOD PRESSURE: 90 MMHG | OXYGEN SATURATION: 98 % | RESPIRATION RATE: 14 BRPM | SYSTOLIC BLOOD PRESSURE: 122 MMHG | HEIGHT: 72 IN | WEIGHT: 179.2 LBS | HEART RATE: 60 BPM | BODY MASS INDEX: 24.27 KG/M2 | TEMPERATURE: 97.2 F

## 2024-10-29 DIAGNOSIS — R00.2 PALPITATIONS: ICD-10-CM

## 2024-10-29 DIAGNOSIS — I48.0 PAROXYSMAL ATRIAL FIBRILLATION: ICD-10-CM

## 2024-10-29 PROBLEM — I48.91 ATRIAL FIBRILLATION: Status: ACTIVE | Noted: 2024-10-29

## 2024-10-29 PROCEDURE — C1894 INTRO/SHEATH, NON-LASER: HCPCS | Performed by: INTERNAL MEDICINE

## 2024-10-29 PROCEDURE — C1766 INTRO/SHEATH,STRBLE,NON-PEEL: HCPCS | Performed by: INTERNAL MEDICINE

## 2024-10-29 PROCEDURE — 25010000002 PHENYLEPHRINE 10 MG/ML SOLUTION 1 ML VIAL: Performed by: NURSE ANESTHETIST, CERTIFIED REGISTERED

## 2024-10-29 PROCEDURE — C1759 CATH, INTRA ECHOCARDIOGRAPHY: HCPCS | Performed by: INTERNAL MEDICINE

## 2024-10-29 PROCEDURE — C1893 INTRO/SHEATH, FIXED,NON-PEEL: HCPCS | Performed by: INTERNAL MEDICINE

## 2024-10-29 PROCEDURE — C1733 CATH, EP, OTHR THAN COOL-TIP: HCPCS | Performed by: INTERNAL MEDICINE

## 2024-10-29 PROCEDURE — 25010000002 PROPOFOL 10 MG/ML EMULSION: Performed by: NURSE ANESTHETIST, CERTIFIED REGISTERED

## 2024-10-29 PROCEDURE — 25010000002 SUGAMMADEX 200 MG/2ML SOLUTION: Performed by: NURSE ANESTHETIST, CERTIFIED REGISTERED

## 2024-10-29 PROCEDURE — 25010000002 LIDOCAINE SOLUTION: Performed by: INTERNAL MEDICINE

## 2024-10-29 PROCEDURE — C1760 CLOSURE DEV, VASC: HCPCS | Performed by: INTERNAL MEDICINE

## 2024-10-29 PROCEDURE — 25010000002 LIDOCAINE 1 % SOLUTION: Performed by: NURSE ANESTHETIST, CERTIFIED REGISTERED

## 2024-10-29 PROCEDURE — 25010000002 DEXAMETHASONE PER 1 MG: Performed by: NURSE ANESTHETIST, CERTIFIED REGISTERED

## 2024-10-29 PROCEDURE — 93623 PRGRMD STIMJ&PACG IV RX NFS: CPT | Performed by: INTERNAL MEDICINE

## 2024-10-29 PROCEDURE — 25010000002 GLYCOPYRROLATE 1 MG/5ML SOLUTION: Performed by: NURSE ANESTHETIST, CERTIFIED REGISTERED

## 2024-10-29 PROCEDURE — C1732 CATH, EP, DIAG/ABL, 3D/VECT: HCPCS | Performed by: INTERNAL MEDICINE

## 2024-10-29 PROCEDURE — 25010000002 PROTAMINE SULFATE PER 10 MG: Performed by: INTERNAL MEDICINE

## 2024-10-29 PROCEDURE — 25810000003 SODIUM CHLORIDE 0.9 % SOLUTION 250 ML FLEX CONT: Performed by: NURSE ANESTHETIST, CERTIFIED REGISTERED

## 2024-10-29 PROCEDURE — 25810000003 SODIUM CHLORIDE 0.9 % SOLUTION: Performed by: NURSE ANESTHETIST, CERTIFIED REGISTERED

## 2024-10-29 PROCEDURE — 85347 COAGULATION TIME ACTIVATED: CPT

## 2024-10-29 PROCEDURE — 93656 COMPRE EP EVAL ABLTJ ATR FIB: CPT | Performed by: INTERNAL MEDICINE

## 2024-10-29 PROCEDURE — C1730 CATH, EP, 19 OR FEW ELECT: HCPCS | Performed by: INTERNAL MEDICINE

## 2024-10-29 PROCEDURE — 25010000002 HEPARIN (PORCINE) PER 1000 UNITS: Performed by: INTERNAL MEDICINE

## 2024-10-29 PROCEDURE — 25010000002 ONDANSETRON PER 1 MG: Performed by: NURSE ANESTHETIST, CERTIFIED REGISTERED

## 2024-10-29 RX ORDER — FAMOTIDINE 10 MG/ML
20 INJECTION, SOLUTION INTRAVENOUS ONCE
Status: DISCONTINUED | OUTPATIENT
Start: 2024-10-29 | End: 2024-10-29

## 2024-10-29 RX ORDER — SODIUM CHLORIDE 0.9 % (FLUSH) 0.9 %
3 SYRINGE (ML) INJECTION EVERY 12 HOURS SCHEDULED
Status: DISCONTINUED | OUTPATIENT
Start: 2024-10-29 | End: 2024-10-29 | Stop reason: HOSPADM

## 2024-10-29 RX ORDER — ONDANSETRON 2 MG/ML
4 INJECTION INTRAMUSCULAR; INTRAVENOUS EVERY 6 HOURS PRN
Status: DISCONTINUED | OUTPATIENT
Start: 2024-10-29 | End: 2024-10-29 | Stop reason: HOSPADM

## 2024-10-29 RX ORDER — HEPARIN SODIUM 1000 [USP'U]/ML
INJECTION, SOLUTION INTRAVENOUS; SUBCUTANEOUS
Status: DISCONTINUED | OUTPATIENT
Start: 2024-10-29 | End: 2024-10-29 | Stop reason: HOSPADM

## 2024-10-29 RX ORDER — ACETAMINOPHEN 325 MG/1
650 TABLET ORAL EVERY 4 HOURS PRN
Status: DISCONTINUED | OUTPATIENT
Start: 2024-10-29 | End: 2024-10-29 | Stop reason: HOSPADM

## 2024-10-29 RX ORDER — SODIUM CHLORIDE 9 MG/ML
40 INJECTION, SOLUTION INTRAVENOUS AS NEEDED
Status: DISCONTINUED | OUTPATIENT
Start: 2024-10-29 | End: 2024-10-29 | Stop reason: HOSPADM

## 2024-10-29 RX ORDER — PROPOFOL 10 MG/ML
VIAL (ML) INTRAVENOUS AS NEEDED
Status: DISCONTINUED | OUTPATIENT
Start: 2024-10-29 | End: 2024-10-29 | Stop reason: SURG

## 2024-10-29 RX ORDER — SODIUM CHLORIDE 0.9 % (FLUSH) 0.9 %
10 SYRINGE (ML) INJECTION EVERY 12 HOURS SCHEDULED
Status: DISCONTINUED | OUTPATIENT
Start: 2024-10-29 | End: 2024-10-29

## 2024-10-29 RX ORDER — SODIUM CHLORIDE, SODIUM LACTATE, POTASSIUM CHLORIDE, CALCIUM CHLORIDE 600; 310; 30; 20 MG/100ML; MG/100ML; MG/100ML; MG/100ML
9 INJECTION, SOLUTION INTRAVENOUS CONTINUOUS
Status: DISCONTINUED | OUTPATIENT
Start: 2024-10-30 | End: 2024-10-29

## 2024-10-29 RX ORDER — MIDAZOLAM HYDROCHLORIDE 1 MG/ML
0.5 INJECTION, SOLUTION INTRAMUSCULAR; INTRAVENOUS
Status: DISCONTINUED | OUTPATIENT
Start: 2024-10-29 | End: 2024-10-29

## 2024-10-29 RX ORDER — LIDOCAINE HYDROCHLORIDE 5 MG/ML
INJECTION, SOLUTION INFILTRATION; PERINEURAL
Status: DISCONTINUED | OUTPATIENT
Start: 2024-10-29 | End: 2024-10-29 | Stop reason: HOSPADM

## 2024-10-29 RX ORDER — ROCURONIUM BROMIDE 10 MG/ML
INJECTION, SOLUTION INTRAVENOUS AS NEEDED
Status: DISCONTINUED | OUTPATIENT
Start: 2024-10-29 | End: 2024-10-29 | Stop reason: SURG

## 2024-10-29 RX ORDER — METOPROLOL SUCCINATE 50 MG/1
50 TABLET, EXTENDED RELEASE ORAL DAILY
Qty: 30 TABLET | Refills: 3 | Status: SHIPPED | OUTPATIENT
Start: 2024-10-29

## 2024-10-29 RX ORDER — DEXAMETHASONE SODIUM PHOSPHATE 4 MG/ML
INJECTION, SOLUTION INTRA-ARTICULAR; INTRALESIONAL; INTRAMUSCULAR; INTRAVENOUS; SOFT TISSUE AS NEEDED
Status: DISCONTINUED | OUTPATIENT
Start: 2024-10-29 | End: 2024-10-29 | Stop reason: SURG

## 2024-10-29 RX ORDER — SODIUM CHLORIDE 0.9 % (FLUSH) 0.9 %
10 SYRINGE (ML) INJECTION AS NEEDED
Status: DISCONTINUED | OUTPATIENT
Start: 2024-10-29 | End: 2024-10-29

## 2024-10-29 RX ORDER — ONDANSETRON 2 MG/ML
INJECTION INTRAMUSCULAR; INTRAVENOUS AS NEEDED
Status: DISCONTINUED | OUTPATIENT
Start: 2024-10-29 | End: 2024-10-29 | Stop reason: SURG

## 2024-10-29 RX ORDER — NITROGLYCERIN 0.4 MG/1
0.4 TABLET SUBLINGUAL
Status: DISCONTINUED | OUTPATIENT
Start: 2024-10-29 | End: 2024-10-29 | Stop reason: HOSPADM

## 2024-10-29 RX ORDER — PANTOPRAZOLE SODIUM 40 MG/1
40 TABLET, DELAYED RELEASE ORAL
Status: DISCONTINUED | OUTPATIENT
Start: 2024-10-29 | End: 2024-10-29 | Stop reason: HOSPADM

## 2024-10-29 RX ORDER — SODIUM CHLORIDE 0.9 % (FLUSH) 0.9 %
10 SYRINGE (ML) INJECTION AS NEEDED
Status: DISCONTINUED | OUTPATIENT
Start: 2024-10-29 | End: 2024-10-29 | Stop reason: HOSPADM

## 2024-10-29 RX ORDER — LIDOCAINE HYDROCHLORIDE 10 MG/ML
INJECTION, SOLUTION INFILTRATION; PERINEURAL AS NEEDED
Status: DISCONTINUED | OUTPATIENT
Start: 2024-10-29 | End: 2024-10-29 | Stop reason: SURG

## 2024-10-29 RX ORDER — ACETAMINOPHEN 650 MG/1
650 SUPPOSITORY RECTAL EVERY 4 HOURS PRN
Status: DISCONTINUED | OUTPATIENT
Start: 2024-10-29 | End: 2024-10-29 | Stop reason: HOSPADM

## 2024-10-29 RX ORDER — FAMOTIDINE 20 MG/1
20 TABLET, FILM COATED ORAL ONCE
Status: COMPLETED | OUTPATIENT
Start: 2024-10-29 | End: 2024-10-29

## 2024-10-29 RX ORDER — SODIUM CHLORIDE 9 MG/ML
INJECTION, SOLUTION INTRAVENOUS CONTINUOUS PRN
Status: DISCONTINUED | OUTPATIENT
Start: 2024-10-29 | End: 2024-10-29 | Stop reason: SURG

## 2024-10-29 RX ORDER — SODIUM CHLORIDE 9 MG/ML
1 INJECTION, SOLUTION INTRAVENOUS CONTINUOUS
Status: DISCONTINUED | OUTPATIENT
Start: 2024-10-29 | End: 2024-10-29

## 2024-10-29 RX ORDER — LIDOCAINE HYDROCHLORIDE 10 MG/ML
0.5 INJECTION, SOLUTION EPIDURAL; INFILTRATION; INTRACAUDAL; PERINEURAL ONCE AS NEEDED
Status: DISCONTINUED | OUTPATIENT
Start: 2024-10-29 | End: 2024-10-29

## 2024-10-29 RX ORDER — GLYCOPYRROLATE 0.2 MG/ML
INJECTION INTRAMUSCULAR; INTRAVENOUS AS NEEDED
Status: DISCONTINUED | OUTPATIENT
Start: 2024-10-29 | End: 2024-10-29 | Stop reason: SURG

## 2024-10-29 RX ORDER — PROTAMINE SULFATE 10 MG/ML
INJECTION, SOLUTION INTRAVENOUS
Status: DISCONTINUED | OUTPATIENT
Start: 2024-10-29 | End: 2024-10-29 | Stop reason: HOSPADM

## 2024-10-29 RX ADMIN — SODIUM CHLORIDE: 9 INJECTION, SOLUTION INTRAVENOUS at 07:35

## 2024-10-29 RX ADMIN — FAMOTIDINE 20 MG: 20 TABLET, FILM COATED ORAL at 06:46

## 2024-10-29 RX ADMIN — GLYCOPYRROLATE 0.2 MG: 0.2 INJECTION INTRAMUSCULAR; INTRAVENOUS at 07:44

## 2024-10-29 RX ADMIN — DEXAMETHASONE SODIUM PHOSPHATE 4 MG: 4 INJECTION, SOLUTION INTRAMUSCULAR; INTRAVENOUS at 07:52

## 2024-10-29 RX ADMIN — LIDOCAINE HYDROCHLORIDE 50 MG: 10 INJECTION, SOLUTION INFILTRATION; PERINEURAL at 07:44

## 2024-10-29 RX ADMIN — PHENYLEPHRINE HYDROCHLORIDE 0.5 MCG/KG/MIN: 10 INJECTION INTRAVENOUS at 08:25

## 2024-10-29 RX ADMIN — ONDANSETRON 4 MG: 2 INJECTION INTRAMUSCULAR; INTRAVENOUS at 09:12

## 2024-10-29 RX ADMIN — ROCURONIUM BROMIDE 20 MG: 10 SOLUTION INTRAVENOUS at 08:08

## 2024-10-29 RX ADMIN — ROCURONIUM BROMIDE 10 MG: 10 SOLUTION INTRAVENOUS at 08:47

## 2024-10-29 RX ADMIN — ROCURONIUM BROMIDE 50 MG: 10 SOLUTION INTRAVENOUS at 07:45

## 2024-10-29 RX ADMIN — PROPOFOL 220 MG: 10 INJECTION, EMULSION INTRAVENOUS at 07:44

## 2024-10-29 RX ADMIN — SUGAMMADEX 200 MG: 100 INJECTION, SOLUTION INTRAVENOUS at 09:13

## 2024-10-29 NOTE — ANESTHESIA PROCEDURE NOTES
Airway  Urgency: elective    Date/Time: 10/29/2024 7:52 AM  Airway not difficult    General Information and Staff    Patient location during procedure: OR    Indications and Patient Condition  Indications for airway management: airway protection    Preoxygenated: yes  MILS not maintained throughout  Mask difficulty assessment: 1 - vent by mask    Final Airway Details  Final airway type: endotracheal airway      Successful airway: ETT  Cuffed: yes   Successful intubation technique: video laryngoscopy  Endotracheal tube insertion site: oral  Blade: Reed  Blade size: 4  ETT size (mm): 7.5  Cormack-Lehane Classification: grade I - full view of glottis  Placement verified by: chest auscultation and capnometry   Measured from: lips  ETT/EBT  to lips (cm): 23  Number of attempts at approach: 1  Assessment: lips, teeth, and gum same as pre-op and atraumatic intubation    Additional Comments  Negative epigastric sounds, Breath sound equal bilaterally with symmetric chest rise and fall

## 2024-10-29 NOTE — Clinical Note
A 7 fr sheath was successfully inserted with ultrasound guidance into the right femoral vein. Sheath insertion not delayed.

## 2024-10-29 NOTE — H&P
Cardiology H&P     Jose Negrete  1960  [phone]  There is no work phone number on file.    10/29/24    DATE OF ADMISSION: 10/29/2024  [facility] Louis Foley MD  [address] / [facility] [address]  Referring Provider: Marv Matias MD     CC: afib    Problem List:      Atrial fibrillation / atrial tachycardia   CHADSVasc = 1, Xarelto   Probable successful radiofrequency ablation of atrial tachycardia at 170 beats per minute.PACs and nonsustained atrial arrhythmias arising from the right upper and left inferior pulmonary vein, no isolation performed 2004  Treated with Flecainide 2017  Echocardiogram 10/27/2014: EF 55-60%, trace MR, RVSP 25  Event Monitor 4/2018: 5 days duration. PAF, longest one hour, short episodes of AT  Echocardiogram 8/31/2020: EF 55%, mild concentric hypertophy, mild MR, mild TR  Breakthrough afib on Flecainide, discontinued. Initiated Rythmol 11/2020  Breakthrough afib on Rythmol        History of Present Illness:   Jose Negrete is a 64-year-old male with above past medical history who presents today for scheduled EP study +/- pulmonary vein ablation.  He has been on chronic antiarrhythmic therapy for many years.  He failed Rythmol with breakthrough A-fib.  He has also had breakthrough A-fib on flecainide.  He denies recent infection, fever, chills, bleeding, signs of CVA/TIA.      Allergies   Allergen Reactions    Epinephrine Provider Review Needed     Caused Afib       Prior to Admission Medications       Prescriptions Last Dose Informant Patient Reported? Taking?    flecainide (TAMBOCOR) 50 MG tablet 10/25/2024 Self Yes Yes    Take 1 tablet by mouth Every 12 (Twelve) Hours.    metoprolol succinate XL (TOPROL-XL) 50 MG 24 hr tablet 10/28/2024 Self Yes Yes    Take 1 tablet by mouth Every 12 (Twelve) Hours.    NON FORMULARY 10/28/2024 Self Yes Yes    Take 1 dose by mouth Daily. INFLA-650  INFLAMMATION MEDICATION    omeprazole  (priLOSEC) 40 MG capsule 10/28/2024 Self Yes Yes    Take 1 capsule by mouth Daily.    rivaroxaban (XARELTO) 20 MG tablet 10/28/2024 Self Yes Yes    Take 1 tablet by mouth Daily.              Current Facility-Administered Medications:     acetaminophen (TYLENOL) tablet 650 mg, 650 mg, Oral, Q4H PRN, Gisel Damico PA    famotidine (PEPCID) injection 20 mg, 20 mg, Intravenous, Once, Louis Giles MD    [START ON 10/30/2024] lactated ringers infusion, 9 mL/hr, Intravenous, Continuous, Louis Giles MD    [START ON 10/30/2024] lactated ringers infusion, 9 mL/hr, Intravenous, Continuous, Louis Giles MD    lidocaine PF 1% (XYLOCAINE) injection 0.5 mL, 0.5 mL, Injection, Once PRN, Louis Giles MD    lidocaine PF 1% (XYLOCAINE) injection 0.5 mL, 0.5 mL, Injection, Once PRN, Louis Giles MD    midazolam (VERSED) injection 0.5 mg, 0.5 mg, Intravenous, Q10 Min PRN, Louis Giles MD    nitroglycerin (NITROSTAT) SL tablet 0.4 mg, 0.4 mg, Sublingual, Q5 Min PRN, Gisel Damico PA    sodium chloride 0.9 % flush 10 mL, 10 mL, Intravenous, Q12H, Louis Giles MD    sodium chloride 0.9 % flush 10 mL, 10 mL, Intravenous, PRN, Louis Giles MD    sodium chloride 0.9 % flush 10 mL, 10 mL, Intravenous, Q12H, Louis Giles MD    sodium chloride 0.9 % flush 10 mL, 10 mL, Intravenous, PRN, Louis Giles MD    sodium chloride 0.9 % flush 10 mL, 10 mL, Intravenous, PRN, Gisel Damico PA    sodium chloride 0.9 % flush 3 mL, 3 mL, Intravenous, Q12H, Gisel Damico PA    sodium chloride 0.9 % infusion 40 mL, 40 mL, Intravenous, PRN, Gisel Damioc PA    Social History     Socioeconomic History    Marital status:    Tobacco Use    Smoking status: Never     Passive exposure: Never    Smokeless tobacco: Never   Vaping Use    Vaping status: Never Used   Substance and Sexual Activity    Alcohol use: Yes     Alcohol/week: 4.0 standard drinks of alcohol     Types: 4 Shots of liquor per week    Drug use: Never  "   Sexual activity: Defer       Family History   Problem Relation Age of Onset    Parkinsonism Father     Heart disease Brother     Heart disease Brother        REVIEW OF SYSTEMS:   CONSTITUTIONAL:         No weight loss, fever, chills, weakness or fatigue.   HEENT:                            No visual loss, blurred vision, double vision, yellow sclerae.                                             No hearing loss, congestion, sore throat.   SKIN:                                No rashes, urticaria, ulcers, sores.     RESPIRATORY:               No shortness of breath, hemoptysis, cough, sputum.   GI:                                     No anorexia, nausea, vomiting, diarrhea. No abdominal pain, melena.   :                                   No burning on urination, hematuria or increased frequency.  ENDOCRINE:                   No diaphoresis, cold or heat intolerance. No polyuria or polydipsia.   NEURO:                            No headache, dizziness, syncope, paralysis, ataxia, or parasthesias.                                            No change in bowel or bladder control. No history of CVA/TIA  MUSCULOSKELETAL:    No muscle, back pain, joint pain or stiffness.   HEMATOLOGY:               No anemia, bleeding, bruising. No history of DVT/PE.  PSYCH:                            No history of depression, anxiety    Vitals:    10/29/24 0619 10/29/24 0625 10/29/24 0627   BP:  (!) 135/104 131/100   BP Location:  Right arm Left arm   Patient Position:  Lying Lying   Pulse:   (!) 48   Resp:   18   Temp:   97.8 °F (36.6 °C)   TempSrc:   Temporal   SpO2:   99%   Weight: 81.3 kg (179 lb 3.2 oz)     Height: 182.9 cm (72\")           Vital Sign Min/Max for last 24 hours  Temp  Min: 97.8 °F (36.6 °C)  Max: 97.8 °F (36.6 °C)   BP  Min: 131/100  Max: 135/104   Pulse  Min: 48  Max: 48   Resp  Min: 18  Max: 18   SpO2  Min: 99 %  Max: 99 %   No data recorded    No intake or output data in the 24 hours ending 10/29/24 0711    "       Physical Exam:  GEN: Well nourished, Well- developed  No acute distress  HEENT: Normocephalic, Atraumatic, PERRLA, moist mucous membranes  NECK: supple, NO JVD, no thyromegaly, no lymphadenopathy  CARDIAC: S1S2  RRR no murmur, gallop, rub  LUNGS: Clear to ausculation, normal respiratory effort  ABDOMEN: Soft, nontender, normal bowel sounds  EXTREMITIES:No gross deformities,  No clubbing, cyanosis, or edema  SKIN: Warm, dry  NEURO: No focal deficits  PSYCHIATRIC: Normal affect and mood      I personally viewed and interpreted the patient's EKG/Telemetry/lab data    Data:   Results from last 7 days   Lab Units 10/22/24  1155   WBC 10*3/mm3 5.06   HEMOGLOBIN g/dL 14.2   HEMATOCRIT % 42.3   PLATELETS 10*3/mm3 210     Results from last 7 days   Lab Units 10/22/24  1155   SODIUM mmol/L 139   POTASSIUM mmol/L 4.7   CHLORIDE mmol/L 105   CO2 mmol/L 25.0   BUN mg/dL 21   CREATININE mg/dL 1.12   GLUCOSE mg/dL 112*      Results from last 7 days   Lab Units 10/22/24  1154   HEMOGLOBIN A1C % 5.40             Results from last 7 days   Lab Units 10/22/24  1155   PROTIME Seconds 20.5*   INR  1.75*                 No intake or output data in the 24 hours ending 10/29/24 0711      Telemetry: Sinus rhythm, heart rate 48 bpm      Assessment and Plan:   Paroxysmal Atrial Fibrillation:  - first diagnosed at time of AT RFA in 2004, and has been treated with Flecainide since then. Failed Rythmol. Continues to have breakthrough afib.   - CHADSvasc = 1 on Xarelto, no missed doses  -Last dose of flecainide 10/24/2024  -plan for EPS +/- PVA today. The risks, benefits, and alternatives of the procedure have been reviewed and the patient wishes to proceed.      2. Atrial Tachycardia:  - s/p RFA in 2004.  No clinical recurrence.        Electronically signed by JAMES Dasilva, 10/29/24, 7:22 AM EDT.

## 2024-10-29 NOTE — ANESTHESIA PREPROCEDURE EVALUATION
Anesthesia Evaluation     Patient summary reviewed and Nursing notes reviewed   NPO Solid Status: > 8 hours  NPO Liquid Status: > 2 hours           Airway   Mallampati: I  TM distance: >3 FB  Neck ROM: full  No difficulty expected  Dental      Pulmonary    (-) asthma, shortness of breath, recent URI, sleep apnea, not a smoker  Cardiovascular     ECG reviewed    (+) dysrhythmias Paroxysmal Atrial Fib  (-) hypertension, past MI, angina, cardiac stents    ROS comment: ECG SB      ECHO 2020 EF>55% mild MR TR ow normal       1.   Atrial fibrillation / atrial tachycardia   a. CHADSVasc = 1, Xarelto   b. Probable successful radiofrequency ablation of atrial tachycardia at 170 beats per minute.PACs and nonsustained atrial arrhythmias arising from the right upper and left inferior pulmonary vein, no isolation performed 2004  c. Treated with Flecainide 2017  d. Echocardiogram 10/27/2014: EF 55-60%, trace MR, RVSP 25  e. Event Monitor 4/2018: 5 days duration. PAF, longest one hour, short episodes of AT  f. Echocardiogram 8/31/2020: EF 55%, mild concentric hypertophy, mild MR, mild TR  g. Breakthrough afib on Flecainide, discontinued. Initiated Rythmol 11/2020  h. Breakthrough afib on Rythmol          Neuro/Psych  (+) numbness  (-) seizures, CVA  GI/Hepatic/Renal/Endo    (+) GERD  (-) no renal disease, diabetes, no thyroid disorder    Musculoskeletal     Abdominal    Substance History      OB/GYN          Other        ROS/Med Hx Other: Labs WNL                 Anesthesia Plan    ASA 3     general     intravenous induction     Anesthetic plan, risks, benefits, and alternatives have been provided, discussed and informed consent has been obtained with: patient.    Plan discussed with CRNA.      CODE STATUS:

## 2024-10-29 NOTE — Clinical Note
A 11 fr sheath was successfully inserted with ultrasound guidance into the left femoral vein. Sheath insertion not delayed.

## 2024-10-29 NOTE — ANESTHESIA POSTPROCEDURE EVALUATION
Patient: Jose Negrete    Procedure Summary       Date: 10/29/24 Room / Location: [facility] CATH/EP LAB G /  [facility] EP INVASIVE LOCATION    Anesthesia Start: 0735 Anesthesia Stop: 0926    Procedure: Ablation atrial fibrillation with PFA. Have him stop his Flecainide 4 days prior to the procedure. Diagnosis:       Palpitations      Paroxysmal atrial fibrillation      (Afib)    Providers: Marv Matias MD Provider: Louis Giles MD    Anesthesia Type: general ASA Status: 3            Anesthesia Type: general    Vitals  No vitals data found for the desired time range.          Post Anesthesia Care and Evaluation    Patient location during evaluation: PACU  Patient participation: complete - patient participated  Level of consciousness: sleepy but conscious  Pain management: adequate    Airway patency: patent  Anesthetic complications: No anesthetic complications  PONV Status: none  Cardiovascular status: hemodynamically stable and acceptable  Respiratory status: nonlabored ventilation, acceptable, nasal cannula and spontaneous ventilation  Hydration status: acceptable

## 2024-10-30 ENCOUNTER — CALL CENTER PROGRAMS (OUTPATIENT)
Dept: CALL CENTER | Facility: HOSPITAL | Age: 64
End: 2024-10-30
Payer: COMMERCIAL

## 2024-10-30 NOTE — OUTREACH NOTE
PCI/Device Survey      Flowsheet Row Responses   Facility patient discharged from? [facility]   Procedure date 10/29/24   Procedure (if device, specify in description) Ablation   Performing MD Dr. Marv Matias   Attempt successful? No   Unsuccessful attempts Attempt 1            PORTIA TALAVERA - Registered Nurse

## 2024-10-30 NOTE — OUTREACH NOTE
PCI/Device Survey      Flowsheet Row Responses   Facility patient discharged from? [facility]   Procedure date 10/29/24   Procedure (if device, specify in description) Ablation   Performing MD Dr. Marv Matias   Attempt successful? Yes   Call start time [phone]   Call end time [phone]   Person spoke with today (if not patient) and relationship wife   Has the patient had any of the following symptoms since discharge? --  [Denies any symptoms]   Is the patient taking prescribed medications: --  [Xarelto]   Nursing intervention Reminded to continue to take prescribed medications   Medication comments Metoprolol succinate dose change.  STop: flecainide   Does the patient have any of the following symptoms related to the cath/surgical site? --  [Dressing remains clean and intact this morning to groin sites.]   Does the patient have an appointment scheduled with the cardiologist? Yes   Appointment comments New Patient with Martell Griffith  Wednesday Dec 4, 2024 1:45 PM, Hospital Follow Up with Marv Matias  Wednesday Jan 29, 2025 2:00 PM   If the patient is a current smoker, are they able to teach back resources for cessation? Not a smoker   Did the patient feel prepared to go home on the same day as the procedure? Yes   Is the patient satisfied with the same day discharge process? Yes   PCI/Device call completed Yes   Wrap up additional comments Wife reports patient has done well overnight. All questions addressed.            PORTIA TALAVERA - Registered Nurse

## 2024-10-31 ENCOUNTER — TELEPHONE (OUTPATIENT)
Dept: CARDIOLOGY | Facility: CLINIC | Age: 64
End: 2024-10-31
Payer: COMMERCIAL

## 2024-10-31 LAB
ACT BLD: 146 SECONDS (ref 82–152)
ACT BLD: 360 SECONDS (ref 82–152)
ACT BLD: 470 SECONDS (ref 82–152)

## 2024-11-04 NOTE — TELEPHONE ENCOUNTER
Returned patients call from Saturday morning.  Patient states that he was having some constipation and eye issues over the weekend that have now resolved.

## 2024-12-04 ENCOUNTER — OFFICE VISIT (OUTPATIENT)
Dept: CARDIOLOGY | Facility: HOSPITAL | Age: 64
End: 2024-12-04
Payer: COMMERCIAL

## 2024-12-04 VITALS
WEIGHT: 181.4 LBS | SYSTOLIC BLOOD PRESSURE: 137 MMHG | HEART RATE: 76 BPM | OXYGEN SATURATION: 96 % | RESPIRATION RATE: 18 BRPM | DIASTOLIC BLOOD PRESSURE: 92 MMHG | BODY MASS INDEX: 24.57 KG/M2 | HEIGHT: 72 IN

## 2024-12-04 DIAGNOSIS — I48.0 PAROXYSMAL ATRIAL FIBRILLATION: Primary | ICD-10-CM

## 2024-12-04 DIAGNOSIS — I47.19 ATRIAL TACHYCARDIA: ICD-10-CM

## 2024-12-04 DIAGNOSIS — Z98.890 HISTORY OF ELECTROPHYSIOLOGIC STUDY: ICD-10-CM

## 2024-12-04 NOTE — PROGRESS NOTES
"[facility], [facility] Heart and Vascular    Chief Complaint  Atrial Fibrillation (POST PVA)    Subjective    History of Present Illness {CC  Problem List  Visit  Diagnosis   Encounters  Notes  Medications  Labs  Result Review Imaging  Media :23}     Jose Negrete presents to [facility] CARDIOLOGY for   History of Present Illness     65 yo male with PAF, atrial tachycardia    Patient with a history of atrial tachycardia and PAF.  RFA in 2004.  Treated with flecainide.  Failed Rythmol.  Status post EP study for A-fib 10/29/202    No palpitations, CP or pressure, dyspnea, dizziness, near syncope, syncope. No groin pain or bruising.  NO dysphagia.     Pt is feeling well.  Back to normal activity.      Pt of Dr. Meraz.      Objective     Vital Signs:   Vitals:    12/04/24 1356 12/04/24 1357   BP: 136/91 137/92   BP Location: Left arm Left arm   Patient Position: Sitting Standing   Cuff Size: Adult Adult   Pulse: 70 76   Resp: 18    SpO2: 96% 96%   Weight: 82.3 kg (181 lb 6.4 oz)    Height: 182.9 cm (72\")      Body mass index is 24.6 kg/m².  Physical Exam  Vitals reviewed.   Constitutional:       General: He is not in acute distress.  Cardiovascular:      Rate and Rhythm: Normal rate and regular rhythm.      Heart sounds: No murmur heard.  Pulmonary:      Effort: Pulmonary effort is normal.      Breath sounds: Normal breath sounds.   Skin:     Coloration: Skin is not pale.   Neurological:      Mental Status: He is alert.   Psychiatric:         Mood and Affect: Mood normal.         Behavior: Behavior normal. Behavior is cooperative.              Result Review  Data Reviewed:{ Labs  Result Review  Imaging  Med Tab  Media :23}   Echocardiogram 10/27/2014: EF 55-60%, trace MR, RVSP 25  Event Monitor 4/2018: 5 days duration. PAF, longest one hour, short episodes of AT  Echocardiogram 8/31/2020: EF 55%, mild concentric hypertophy, mild MR, mild TR  Lab " "Results   Component Value Date    WBC 5.06 10/22/2024    HGB 14.2 10/22/2024    HCT 42.3 10/22/2024    MCV 91.8 10/22/2024     10/22/2024     Lab Results   Component Value Date    GLUCOSE 112 (H) 10/22/2024    BUN 21 10/22/2024    CREATININE 1.12 10/22/2024     10/22/2024    K 4.7 10/22/2024     10/22/2024    CALCIUM 9.3 10/22/2024    PROTEINTOT 6.1 (L) 09/23/2021    ALBUMIN 3.4 09/23/2021    ALT 11 09/23/2021    AST 23 09/23/2021    ALKPHOS 93 09/23/2021    BILITOT 0.5 09/23/2021    GLOB 2.7 09/23/2021    BCR 18.8 10/22/2024    ANIONGAP 9.0 10/22/2024    EGFR 72.5 10/22/2024     No results found for: \"TSH\"  No results found for: \"CHOL\", \"CHLPL\"  No results found for: \"TRIG\"  No results found for: \"HDL\"  No results found for: \"LDL\", \"LDLDIRECT\"                Assessment and Plan {CC Problem List  Visit Diagnosis  ROS  Review (Popup)  Health Maintenance  Quality  BestPractice  Medications  SmartSets  SnapShot Encounters  Media :23}   1. Paroxysmal atrial fibrillation  Flecainide discontinued  Currently on metoprolol and Xarelto for stroke prevention    Status post PVA  Rate and rhythm today.    2. Atrial tachycardia  No significant palpitations    3. History of electrophysiologic study  Discussed postprocedural expectations and management.  Reviewed the role of the heart valve center and when to call    Follow-up with primary cardiologist and electrophysiologist as scheduled.  Reviewed the role the heart valve center and when to call as needed.      Follow Up {Instructions Charge Capture  Follow-up Communications :23}   Return if symptoms worsen or fail to improve.    Patient was given instructions and counseling regarding his condition or for health maintenance advice. Please see specific information pulled into the AVS if appropriate.  Patient was instructed to call the [facility] Center with any questions, concerns, or worsening symptoms.  "

## 2025-01-29 ENCOUNTER — OFFICE VISIT (OUTPATIENT)
Dept: CARDIOLOGY | Facility: CLINIC | Age: 65
End: 2025-01-29
Payer: COMMERCIAL

## 2025-01-29 VITALS
OXYGEN SATURATION: 99 % | BODY MASS INDEX: 24.19 KG/M2 | HEIGHT: 72 IN | DIASTOLIC BLOOD PRESSURE: 88 MMHG | WEIGHT: 178.6 LBS | SYSTOLIC BLOOD PRESSURE: 134 MMHG | HEART RATE: 69 BPM

## 2025-01-29 DIAGNOSIS — I48.0 PAROXYSMAL ATRIAL FIBRILLATION: Primary | ICD-10-CM

## 2025-01-29 DIAGNOSIS — I47.19 ATRIAL TACHYCARDIA: ICD-10-CM

## 2025-01-29 RX ORDER — METOPROLOL SUCCINATE 25 MG/1
1 TABLET, EXTENDED RELEASE ORAL DAILY
COMMUNITY
Start: 2024-12-13

## 2025-01-29 NOTE — PROGRESS NOTES
Jose Negrete  1960  [phone]    01/29/2025    [facility] CARDIOLOGY     Referring Provider: No ref. provider found     Louis Cisneros MD  [address]  [place] [id number]    Chief Complaint   Patient presents with    Paroxysmal atrial fibrillation     Problem List:      Atrial fibrillation / atrial tachycardia   CHADSVasc = 1, Xarelto   Probable successful radiofrequency ablation of atrial tachycardia at 170 beats per minute.PACs and nonsustained atrial arrhythmias arising from the right upper and left inferior pulmonary vein, no isolation performed 2004  Treated with Flecainide 2017  Echocardiogram 10/27/2014: EF 55-60%, trace MR, RVSP 25  Event Monitor 4/2018: 5 days duration. PAF, longest one hour, short episodes of AT  Echocardiogram 8/31/2020: EF 55%, mild concentric hypertophy, mild MR, mild TR  Breakthrough afib on Flecainide, discontinued. Initiated Rythmol 11/2020  Breakthrough afib on Rythmol   10/29/2024 PFA PV/LA PW      Allergies  Allergies   Allergen Reactions    Epinephrine Provider Review Needed     Caused Afib       Current Medications    Current Outpatient Medications:     metoprolol succinate XL (TOPROL-XL) 25 MG 24 hr tablet, Take 1 tablet by mouth Daily., Disp: , Rfl:     omeprazole (priLOSEC) 40 MG capsule, Take 1 capsule by mouth Daily., Disp: , Rfl:     rivaroxaban (XARELTO) 20 MG tablet, Take 1 tablet by mouth Daily., Disp: , Rfl:     History of Present Illness     Pt presents for follow up of AF. Since we last saw the pt, pt underwent PFA approximately 3 months ago.  Since that time is done very well.  Denies any AF episodes, SOB, CP, LH, and dizziness. Denies any hospitalizations, ER visits, bleeding, or TIA/CVA symptoms. Overall feels well.  Blood pressures have been stable.          Vitals:    01/29/25 1424   BP: 134/88   BP Location: Left arm   Patient Position: Sitting   Cuff Size: Adult   Pulse: 69   SpO2: 99%   Weight: 81 kg (178 lb  "9.6 oz)   Height: 182.9 cm (72\")     Body mass index is 24.22 kg/m².  PE:  General: NAD  Neck: no JVD, no carotid bruits, no TM  Heart RRR, NL S1, S2, S4 present, no rubs, murmurs  Lungs: CTA, no wheezes, rhonchi, or rales  Abd: soft, non-tender, NL BS  Ext: No musculoskeletal deformities, no edema, cyanosis, or clubbing  Psych: normal mood and affect    Diagnostic Data:        ECG 12 Lead    Date/Time: 1/29/2025 2:42 PM  Performed by: Marv Matias MD    Authorized by: Marv Matias MD  Comparison: compared with previous ECG from 10/28/2024  Similar to previous ECG  Rhythm: sinus rhythm  BPM: 70                 1. Paroxysmal atrial fibrillation    2. Atrial tachycardia          Plan:    Paroxysmal Atrial Fibrillation:  - s/p PFA  No clinical recurrence.   - CHADSvasc = 1 on Xarelto, Stop xarelto. Start ASA 81 mg po daily.  Will discontinue metoprolol at this time as well as the patient does not want to take any medications.  He is to check his blood pressure as needed    2. Atrial Tachycardia:  - s/p RFA in 2004.  No clinical recurrence.       F/up in 3 months      "

## 2025-04-30 ENCOUNTER — OFFICE VISIT (OUTPATIENT)
Dept: CARDIOLOGY | Facility: CLINIC | Age: 65
End: 2025-04-30
Payer: COMMERCIAL

## 2025-04-30 VITALS
HEART RATE: 63 BPM | WEIGHT: 177.4 LBS | DIASTOLIC BLOOD PRESSURE: 82 MMHG | OXYGEN SATURATION: 99 % | BODY MASS INDEX: 24.03 KG/M2 | SYSTOLIC BLOOD PRESSURE: 122 MMHG | HEIGHT: 72 IN

## 2025-04-30 DIAGNOSIS — R00.2 PALPITATIONS: ICD-10-CM

## 2025-04-30 DIAGNOSIS — I47.19 ATRIAL TACHYCARDIA: ICD-10-CM

## 2025-04-30 DIAGNOSIS — I48.0 PAROXYSMAL ATRIAL FIBRILLATION: Primary | ICD-10-CM

## 2025-04-30 RX ORDER — ASPIRIN 81 MG/1
81 TABLET ORAL DAILY
COMMUNITY

## 2025-04-30 NOTE — PROGRESS NOTES
Jose Negrete  1960  [phone]        [facility] CARDIOLOGY       Louis Cisneros MD  [address]  [place] [id number]    Chief Complaint   Patient presents with    Paroxysmal atrial fibrillation     Problem List:      Atrial fibrillation / atrial tachycardia   CHADSVasc = 1, Xarelto   Probable successful radiofrequency ablation of atrial tachycardia at 170 beats per minute.PACs and nonsustained atrial arrhythmias arising from the right upper and left inferior pulmonary vein, no isolation performed 2004  Treated with Flecainide 2017  Echocardiogram 10/27/2014: EF 55-60%, trace MR, RVSP 25  Event Monitor 4/2018: 5 days duration. PAF, longest one hour, short episodes of AT  Echocardiogram 8/31/2020: EF 55%, mild concentric hypertophy, mild MR, mild TR  Breakthrough afib on Flecainide, discontinued. Initiated Rythmol 11/2020  Breakthrough afib on Rythmol   10/29/2024 PFA PV/LA PW      Allergies  Allergies   Allergen Reactions    Epinephrine Provider Review Needed     Caused Afib       Current Medications    Current Outpatient Medications:     aspirin 81 MG EC tablet, Take 1 tablet by mouth Daily., Disp: , Rfl:     omeprazole (priLOSEC) 40 MG capsule, Take 1 capsule by mouth Daily., Disp: , Rfl:     metoprolol succinate XL (TOPROL-XL) 25 MG 24 hr tablet, Take 1 tablet by mouth Daily. (Patient not taking: Reported on 4/30/2025), Disp: , Rfl:     History of Present Illness     Pt presents for follow up of AF. Since we last saw the pt, pt underwent PFA approximately 6 months ago.  Since that time is done very well.  Denies any AF episodes, SOB, CP, LH, and dizziness. Denies any hospitalizations, ER visits, bleeding, or TIA/CVA symptoms. Overall feels well.  Blood pressures have been stable.  He stays active in a rock band.          Vitals:    04/30/25 1414   BP: 122/82   BP Location: Right arm   Patient Position: Sitting   Cuff Size: Adult   Pulse: 63   SpO2: 99%   Weight: 80.5  "kg (177 lb 6.4 oz)   Height: 182.9 cm (72\")     Body mass index is 24.06 kg/m².  PE:  General: NAD  Neck: no JVD, no carotid bruits, no TM  Heart RRR, NL S1, S2, S4 present, no rubs, murmurs  Lungs: CTA, no wheezes, rhonchi, or rales  Abd: soft, non-tender, NL BS  Ext: No musculoskeletal deformities, no edema, cyanosis, or clubbing  Psych: normal mood and affect    Diagnostic Data:      Procedures  EKG NSR          1. Paroxysmal atrial fibrillation    2. Atrial tachycardia    3. Palpitations          Plan:    Paroxysmal Atrial Fibrillation:  - s/p PFA  No clinical recurrence.   - CHADSvasc = 1 on Xarelto,  ASA 81 mg po daily.  Off bb.     2. Atrial Tachycardia:  - s/p RFA in 2004.  No clinical recurrence.         Electronically signed by ROSA Ann, 04/30/25, 2:25 PM EDT.   "

## (undated) DEVICE — Device: Brand: REFERENCE PATCH CARTO 3

## (undated) DEVICE — INTRO SHEATH ENGAGE W/50 GW .038 7F12

## (undated) DEVICE — SOL NACL 0.9PCT 1000ML

## (undated) DEVICE — ADULT, W/LG. BACK PAD, RADIOTRANSPARENT ELEMENT AND LEAD WIRE COMPATIBLE W/: Brand: DEFIBRILLATION ELECTRODES

## (undated) DEVICE — Device: Brand: SMARTABLATE

## (undated) DEVICE — DRSNG SURESITE123 4X4.8IN

## (undated) DEVICE — PRESSURE MONITORING SET: Brand: TRUWAVE

## (undated) DEVICE — KT MANIFLD EP

## (undated) DEVICE — PULSED FIELD ABLATION CATHETER: Brand: FARAWAVE™

## (undated) DEVICE — Device: Brand: PENTARAY NAV

## (undated) DEVICE — SET PRIMARY GRVTY 10DP MALE LL 104IN

## (undated) DEVICE — SYS CLS VASC/VENI VASCADE/MVP 10TO12F XL

## (undated) DEVICE — STEERABLE SHEATH CLEAR: Brand: FARADRIVE™

## (undated) DEVICE — ST EXT IV SMRTSTE 2VLV FIX M LL 6ML 41

## (undated) DEVICE — INTRO SHEATH FAST/CATH LG/LUM 11F .038IN 12CM

## (undated) DEVICE — Device: Brand: SOUNDSTAR

## (undated) DEVICE — Device: Brand: MEDEX

## (undated) DEVICE — ACQGUIDE MINI-S, 65CM - L2 WITH ACQCROSS QX: Brand: ACQGUIDE MINI-S

## (undated) DEVICE — LEX ELECTRO PHYSIOLOGY: Brand: MEDLINE INDUSTRIES, INC.

## (undated) DEVICE — TBG PRESS MON 48IN M/F L/L: Brand: MEDLINE INDUSTRIES, INC.

## (undated) DEVICE — ST INF PRI SMRTSTE 20DRP 2VLV 24ML 117

## (undated) DEVICE — ST EXT IV SMARTSITE PINCH/CLMP 5ML 46CM

## (undated) DEVICE — Device: Brand: WEBSTER CS

## (undated) DEVICE — DECANT BG O JET

## (undated) DEVICE — SYS CLS VASC/VENI VASCADE MVP 6TO12F

## (undated) DEVICE — DOME MONITORING W BONDED STPCK BIOTRANS2

## (undated) DEVICE — CATHETER CONNECTION CABLE: Brand: FARASTAR™

## (undated) DEVICE — STERILE (15.2 TAPERED TO 7.6 X 183CM) POLYETHYLENE ACCORDION-FOLDED COVER FOR USE WITH SIEMENS ACUNAV ULTRASOUND CATHETER FAMILY CONNECTOR: Brand: SWIFTLINK TRANSDUCER COVER

## (undated) DEVICE — INTRO SHEATH ENGAGE W/50 GW .038 8F12